# Patient Record
Sex: MALE | Race: WHITE | NOT HISPANIC OR LATINO | Employment: OTHER | URBAN - METROPOLITAN AREA
[De-identification: names, ages, dates, MRNs, and addresses within clinical notes are randomized per-mention and may not be internally consistent; named-entity substitution may affect disease eponyms.]

---

## 2017-05-16 ENCOUNTER — RECORDS - HEALTHEAST (OUTPATIENT)
Dept: LAB | Facility: CLINIC | Age: 72
End: 2017-05-16

## 2017-05-16 LAB
CHOLEST SERPL-MCNC: 176 MG/DL
FASTING STATUS PATIENT QL REPORTED: YES
HDLC SERPL-MCNC: 65 MG/DL
LDLC SERPL CALC-MCNC: 91 MG/DL
TRIGL SERPL-MCNC: 98 MG/DL

## 2017-08-01 ENCOUNTER — RECORDS - HEALTHEAST (OUTPATIENT)
Dept: LAB | Facility: CLINIC | Age: 72
End: 2017-08-01

## 2017-08-01 LAB — PSA SERPL-MCNC: 0.5 NG/ML (ref 0–6.5)

## 2018-01-24 ENCOUNTER — AMBULATORY - HEALTHEAST (OUTPATIENT)
Dept: CARDIOLOGY | Facility: CLINIC | Age: 73
End: 2018-01-24

## 2018-01-24 ENCOUNTER — RECORDS - HEALTHEAST (OUTPATIENT)
Dept: ADMINISTRATIVE | Facility: OTHER | Age: 73
End: 2018-01-24

## 2018-01-30 ENCOUNTER — OFFICE VISIT - HEALTHEAST (OUTPATIENT)
Dept: CARDIOLOGY | Facility: CLINIC | Age: 73
End: 2018-01-30

## 2018-01-30 DIAGNOSIS — E78.5 DYSLIPIDEMIA: ICD-10-CM

## 2018-01-30 DIAGNOSIS — I10 ESSENTIAL HYPERTENSION: ICD-10-CM

## 2018-01-30 DIAGNOSIS — I25.10 CORONARY ARTERY DISEASE INVOLVING NATIVE CORONARY ARTERY OF NATIVE HEART WITHOUT ANGINA PECTORIS: ICD-10-CM

## 2018-01-30 ASSESSMENT — MIFFLIN-ST. JEOR: SCORE: 1978.23

## 2018-02-06 ENCOUNTER — RECORDS - HEALTHEAST (OUTPATIENT)
Dept: LAB | Facility: CLINIC | Age: 73
End: 2018-02-06

## 2018-02-06 LAB
ALBUMIN SERPL-MCNC: 3.9 G/DL (ref 3.5–5)
ALP SERPL-CCNC: 97 U/L (ref 45–120)
ALT SERPL W P-5'-P-CCNC: 34 U/L (ref 0–45)
ANION GAP SERPL CALCULATED.3IONS-SCNC: 10 MMOL/L (ref 5–18)
AST SERPL W P-5'-P-CCNC: 24 U/L (ref 0–40)
BILIRUB SERPL-MCNC: 0.9 MG/DL (ref 0–1)
BUN SERPL-MCNC: 21 MG/DL (ref 8–28)
CALCIUM SERPL-MCNC: 9.4 MG/DL (ref 8.5–10.5)
CHLORIDE BLD-SCNC: 106 MMOL/L (ref 98–107)
CHOLEST SERPL-MCNC: 205 MG/DL
CO2 SERPL-SCNC: 25 MMOL/L (ref 22–31)
CREAT SERPL-MCNC: 0.99 MG/DL (ref 0.7–1.3)
FASTING STATUS PATIENT QL REPORTED: YES
GFR SERPL CREATININE-BSD FRML MDRD: >60 ML/MIN/1.73M2
GLUCOSE BLD-MCNC: 100 MG/DL (ref 70–125)
HDLC SERPL-MCNC: 69 MG/DL
LDLC SERPL CALC-MCNC: 113 MG/DL
POTASSIUM BLD-SCNC: 4.3 MMOL/L (ref 3.5–5)
PROT SERPL-MCNC: 6.5 G/DL (ref 6–8)
PSA SERPL-MCNC: 0.6 NG/ML (ref 0–6.5)
SODIUM SERPL-SCNC: 141 MMOL/L (ref 136–145)
TRIGL SERPL-MCNC: 113 MG/DL

## 2018-07-12 ENCOUNTER — RECORDS - HEALTHEAST (OUTPATIENT)
Dept: LAB | Facility: CLINIC | Age: 73
End: 2018-07-12

## 2018-07-12 LAB — PSA SERPL-MCNC: 0.5 NG/ML (ref 0–6.5)

## 2018-12-27 ENCOUNTER — COMMUNICATION - HEALTHEAST (OUTPATIENT)
Dept: ADMINISTRATIVE | Facility: CLINIC | Age: 73
End: 2018-12-27

## 2019-01-23 ENCOUNTER — RECORDS - HEALTHEAST (OUTPATIENT)
Dept: LAB | Facility: CLINIC | Age: 74
End: 2019-01-23

## 2019-01-23 LAB
ALBUMIN SERPL-MCNC: 4 G/DL (ref 3.5–5)
ALP SERPL-CCNC: 94 U/L (ref 45–120)
ALT SERPL W P-5'-P-CCNC: 44 U/L (ref 0–45)
ANION GAP SERPL CALCULATED.3IONS-SCNC: 13 MMOL/L (ref 5–18)
AST SERPL W P-5'-P-CCNC: 29 U/L (ref 0–40)
BILIRUB SERPL-MCNC: 0.8 MG/DL (ref 0–1)
BUN SERPL-MCNC: 23 MG/DL (ref 8–28)
CALCIUM SERPL-MCNC: 9.7 MG/DL (ref 8.5–10.5)
CHLORIDE BLD-SCNC: 107 MMOL/L (ref 98–107)
CHOLEST SERPL-MCNC: 191 MG/DL
CO2 SERPL-SCNC: 21 MMOL/L (ref 22–31)
CREAT SERPL-MCNC: 1 MG/DL (ref 0.7–1.3)
FASTING STATUS PATIENT QL REPORTED: NO
GFR SERPL CREATININE-BSD FRML MDRD: >60 ML/MIN/1.73M2
GLUCOSE BLD-MCNC: 99 MG/DL (ref 70–125)
HDLC SERPL-MCNC: 80 MG/DL
LDLC SERPL CALC-MCNC: 94 MG/DL
POTASSIUM BLD-SCNC: 4.2 MMOL/L (ref 3.5–5)
PROT SERPL-MCNC: 6.5 G/DL (ref 6–8)
PSA SERPL-MCNC: 0.8 NG/ML (ref 0–6.5)
SODIUM SERPL-SCNC: 141 MMOL/L (ref 136–145)
TRIGL SERPL-MCNC: 84 MG/DL

## 2019-01-24 ENCOUNTER — RECORDS - HEALTHEAST (OUTPATIENT)
Dept: ADMINISTRATIVE | Facility: OTHER | Age: 74
End: 2019-01-24

## 2019-01-24 ENCOUNTER — AMBULATORY - HEALTHEAST (OUTPATIENT)
Dept: CARDIOLOGY | Facility: CLINIC | Age: 74
End: 2019-01-24

## 2019-01-31 ENCOUNTER — OFFICE VISIT - HEALTHEAST (OUTPATIENT)
Dept: CARDIOLOGY | Facility: CLINIC | Age: 74
End: 2019-01-31

## 2019-01-31 DIAGNOSIS — I25.10 CORONARY ARTERY DISEASE INVOLVING NATIVE CORONARY ARTERY OF NATIVE HEART WITHOUT ANGINA PECTORIS: ICD-10-CM

## 2019-01-31 DIAGNOSIS — I10 ESSENTIAL HYPERTENSION: ICD-10-CM

## 2019-01-31 DIAGNOSIS — E78.5 DYSLIPIDEMIA: ICD-10-CM

## 2019-01-31 ASSESSMENT — MIFFLIN-ST. JEOR: SCORE: 1878.44

## 2019-07-18 ENCOUNTER — RECORDS - HEALTHEAST (OUTPATIENT)
Dept: LAB | Facility: CLINIC | Age: 74
End: 2019-07-18

## 2019-07-18 LAB — PSA SERPL-MCNC: 0.9 NG/ML (ref 0–6.5)

## 2020-01-09 ENCOUNTER — RECORDS - HEALTHEAST (OUTPATIENT)
Dept: LAB | Facility: CLINIC | Age: 75
End: 2020-01-09

## 2020-01-09 LAB
ALBUMIN SERPL-MCNC: 4.1 G/DL (ref 3.5–5)
ALP SERPL-CCNC: 97 U/L (ref 45–120)
ALT SERPL W P-5'-P-CCNC: 32 U/L (ref 0–45)
ANION GAP SERPL CALCULATED.3IONS-SCNC: 11 MMOL/L (ref 5–18)
AST SERPL W P-5'-P-CCNC: 25 U/L (ref 0–40)
BILIRUB SERPL-MCNC: 0.9 MG/DL (ref 0–1)
BUN SERPL-MCNC: 16 MG/DL (ref 8–28)
CALCIUM SERPL-MCNC: 9.4 MG/DL (ref 8.5–10.5)
CHLORIDE BLD-SCNC: 106 MMOL/L (ref 98–107)
CHOLEST SERPL-MCNC: 189 MG/DL
CO2 SERPL-SCNC: 24 MMOL/L (ref 22–31)
CREAT SERPL-MCNC: 0.97 MG/DL (ref 0.7–1.3)
FASTING STATUS PATIENT QL REPORTED: YES
GFR SERPL CREATININE-BSD FRML MDRD: >60 ML/MIN/1.73M2
GLUCOSE BLD-MCNC: 94 MG/DL (ref 70–125)
HDLC SERPL-MCNC: 74 MG/DL
LDLC SERPL CALC-MCNC: 98 MG/DL
POTASSIUM BLD-SCNC: 4.1 MMOL/L (ref 3.5–5)
PROT SERPL-MCNC: 6.6 G/DL (ref 6–8)
PSA SERPL-MCNC: 1 NG/ML (ref 0–6.5)
SODIUM SERPL-SCNC: 141 MMOL/L (ref 136–145)
TRIGL SERPL-MCNC: 83 MG/DL

## 2020-01-13 ENCOUNTER — AMBULATORY - HEALTHEAST (OUTPATIENT)
Dept: CARDIOLOGY | Facility: CLINIC | Age: 75
End: 2020-01-13

## 2020-02-27 ENCOUNTER — AMBULATORY - HEALTHEAST (OUTPATIENT)
Dept: CARDIOLOGY | Facility: CLINIC | Age: 75
End: 2020-02-27

## 2020-02-27 ENCOUNTER — RECORDS - HEALTHEAST (OUTPATIENT)
Dept: ADMINISTRATIVE | Facility: OTHER | Age: 75
End: 2020-02-27

## 2020-03-04 ENCOUNTER — OFFICE VISIT - HEALTHEAST (OUTPATIENT)
Dept: CARDIOLOGY | Facility: CLINIC | Age: 75
End: 2020-03-04

## 2020-03-04 DIAGNOSIS — I25.10 CORONARY ARTERY DISEASE INVOLVING NATIVE CORONARY ARTERY OF NATIVE HEART WITHOUT ANGINA PECTORIS: ICD-10-CM

## 2020-03-04 DIAGNOSIS — I10 ESSENTIAL HYPERTENSION: ICD-10-CM

## 2020-03-04 DIAGNOSIS — R07.2 PRECORDIAL PAIN: ICD-10-CM

## 2020-03-04 DIAGNOSIS — E78.5 DYSLIPIDEMIA: ICD-10-CM

## 2020-03-04 ASSESSMENT — MIFFLIN-ST. JEOR: SCORE: 1923.8

## 2020-03-19 ENCOUNTER — HOSPITAL ENCOUNTER (OUTPATIENT)
Dept: CARDIOLOGY | Facility: CLINIC | Age: 75
Discharge: HOME | End: 2020-03-19
Attending: INTERNAL MEDICINE

## 2020-03-19 DIAGNOSIS — R07.2 PRECORDIAL PAIN: ICD-10-CM

## 2020-03-19 LAB
CV STRESS CURRENT BP HE: NORMAL
CV STRESS CURRENT HR HE: 106
CV STRESS CURRENT HR HE: 108
CV STRESS CURRENT HR HE: 108
CV STRESS CURRENT HR HE: 111
CV STRESS CURRENT HR HE: 120
CV STRESS CURRENT HR HE: 122
CV STRESS CURRENT HR HE: 124
CV STRESS CURRENT HR HE: 128
CV STRESS CURRENT HR HE: 128
CV STRESS CURRENT HR HE: 129
CV STRESS CURRENT HR HE: 69
CV STRESS CURRENT HR HE: 74
CV STRESS CURRENT HR HE: 75
CV STRESS CURRENT HR HE: 76
CV STRESS CURRENT HR HE: 77
CV STRESS CURRENT HR HE: 80
CV STRESS CURRENT HR HE: 80
CV STRESS CURRENT HR HE: 82
CV STRESS CURRENT HR HE: 83
CV STRESS CURRENT HR HE: 86
CV STRESS CURRENT HR HE: 89
CV STRESS CURRENT HR HE: 92
CV STRESS CURRENT HR HE: 95
CV STRESS CURRENT HR HE: 96
CV STRESS CURRENT HR HE: 96
CV STRESS CURRENT HR HE: 98
CV STRESS DEVIATION TIME HE: NORMAL
CV STRESS ECHO PERCENT HR HE: NORMAL
CV STRESS EXERCISE STAGE HE: NORMAL
CV STRESS FINAL RESTING BP HE: NORMAL
CV STRESS FINAL RESTING HR HE: 77
CV STRESS MAX HR HE: 129
CV STRESS MAX TREADMILL GRADE HE: 14
CV STRESS MAX TREADMILL SPEED HE: 3.4
CV STRESS PEAK DIA BP HE: NORMAL
CV STRESS PEAK SYS BP HE: NORMAL
CV STRESS PHASE HE: NORMAL
CV STRESS PROTOCOL HE: NORMAL
CV STRESS RESTING PT POSITION HE: NORMAL
CV STRESS RESTING PT POSITION HE: NORMAL
CV STRESS ST DEVIATION AMOUNT HE: NORMAL
CV STRESS ST DEVIATION ELEVATION HE: NORMAL
CV STRESS ST EVELATION AMOUNT HE: NORMAL
CV STRESS TEST TYPE HE: NORMAL
CV STRESS TOTAL STAGE TIME MIN 1 HE: NORMAL
ECHO EJECTION FRACTION ESTIMATED: 60 %
RATE PRESSURE PRODUCT: NORMAL
STRESS ECHO BASELINE DIASTOLIC HE: 95
STRESS ECHO BASELINE HR: 75
STRESS ECHO BASELINE SYSTOLIC BP: 141
STRESS ECHO CALCULATED PERCENT HR: 87 %
STRESS ECHO LAST STRESS DIASTOLIC BP: 92
STRESS ECHO LAST STRESS HR: 128
STRESS ECHO LAST STRESS SYSTOLIC BP: 240
STRESS ECHO POST ESTIMATED WORKLOAD: 10.3
STRESS ECHO POST EXERCISE DUR MIN: 8
STRESS ECHO POST EXERCISE DUR SEC: 45
STRESS ECHO TARGET HR: 148

## 2020-08-04 ENCOUNTER — RECORDS - HEALTHEAST (OUTPATIENT)
Dept: LAB | Facility: CLINIC | Age: 75
End: 2020-08-04

## 2020-08-04 LAB — PSA SERPL-MCNC: 1.1 NG/ML (ref 0–6.5)

## 2021-01-14 ENCOUNTER — RECORDS - HEALTHEAST (OUTPATIENT)
Dept: LAB | Facility: CLINIC | Age: 76
End: 2021-01-14

## 2021-01-14 LAB
ALBUMIN SERPL-MCNC: 4 G/DL (ref 3.5–5)
ALP SERPL-CCNC: 100 U/L (ref 45–120)
ALT SERPL W P-5'-P-CCNC: 33 U/L (ref 0–45)
ANION GAP SERPL CALCULATED.3IONS-SCNC: 12 MMOL/L (ref 5–18)
AST SERPL W P-5'-P-CCNC: 25 U/L (ref 0–40)
BILIRUB SERPL-MCNC: 0.9 MG/DL (ref 0–1)
BUN SERPL-MCNC: 23 MG/DL (ref 8–28)
CALCIUM SERPL-MCNC: 9.6 MG/DL (ref 8.5–10.5)
CHLORIDE BLD-SCNC: 108 MMOL/L (ref 98–107)
CHOLEST SERPL-MCNC: 149 MG/DL
CO2 SERPL-SCNC: 21 MMOL/L (ref 22–31)
CREAT SERPL-MCNC: 1.08 MG/DL (ref 0.7–1.3)
FASTING STATUS PATIENT QL REPORTED: NO
GFR SERPL CREATININE-BSD FRML MDRD: >60 ML/MIN/1.73M2
GLUCOSE BLD-MCNC: 91 MG/DL (ref 70–125)
HDLC SERPL-MCNC: 61 MG/DL
LDLC SERPL CALC-MCNC: 71 MG/DL
POTASSIUM BLD-SCNC: 4.7 MMOL/L (ref 3.5–5)
PROT SERPL-MCNC: 6.4 G/DL (ref 6–8)
PSA SERPL-MCNC: 1.6 NG/ML (ref 0–6.5)
SODIUM SERPL-SCNC: 141 MMOL/L (ref 136–145)
TRIGL SERPL-MCNC: 84 MG/DL

## 2021-02-23 ENCOUNTER — COMMUNICATION - HEALTHEAST (OUTPATIENT)
Dept: CARDIOLOGY | Facility: CLINIC | Age: 76
End: 2021-02-23

## 2021-02-23 DIAGNOSIS — E78.5 DYSLIPIDEMIA: ICD-10-CM

## 2021-03-18 ENCOUNTER — AMBULATORY - HEALTHEAST (OUTPATIENT)
Dept: CARDIOLOGY | Facility: CLINIC | Age: 76
End: 2021-03-18

## 2021-03-18 ENCOUNTER — RECORDS - HEALTHEAST (OUTPATIENT)
Dept: ADMINISTRATIVE | Facility: OTHER | Age: 76
End: 2021-03-18

## 2021-03-19 ENCOUNTER — RECORDS - HEALTHEAST (OUTPATIENT)
Dept: ADMINISTRATIVE | Facility: OTHER | Age: 76
End: 2021-03-19

## 2021-03-23 ENCOUNTER — OFFICE VISIT - HEALTHEAST (OUTPATIENT)
Dept: CARDIOLOGY | Facility: CLINIC | Age: 76
End: 2021-03-23

## 2021-03-23 DIAGNOSIS — I10 ESSENTIAL HYPERTENSION: ICD-10-CM

## 2021-03-23 DIAGNOSIS — E78.5 DYSLIPIDEMIA: ICD-10-CM

## 2021-03-23 DIAGNOSIS — I25.10 CORONARY ARTERY DISEASE INVOLVING NATIVE CORONARY ARTERY OF NATIVE HEART WITHOUT ANGINA PECTORIS: ICD-10-CM

## 2021-03-23 ASSESSMENT — MIFFLIN-ST. JEOR: SCORE: 1901.12

## 2021-03-31 ENCOUNTER — AMBULATORY - HEALTHEAST (OUTPATIENT)
Dept: SURGERY | Facility: HOSPITAL | Age: 76
End: 2021-03-31

## 2021-03-31 DIAGNOSIS — Z11.59 ENCOUNTER FOR SCREENING FOR OTHER VIRAL DISEASES: ICD-10-CM

## 2021-04-13 ENCOUNTER — COMMUNICATION - HEALTHEAST (OUTPATIENT)
Dept: SCHEDULING | Facility: CLINIC | Age: 76
End: 2021-04-13

## 2021-04-15 ENCOUNTER — RECORDS - HEALTHEAST (OUTPATIENT)
Dept: LAB | Facility: CLINIC | Age: 76
End: 2021-04-15

## 2021-04-15 LAB
ANION GAP SERPL CALCULATED.3IONS-SCNC: 12 MMOL/L (ref 5–18)
BUN SERPL-MCNC: 24 MG/DL (ref 8–28)
CALCIUM SERPL-MCNC: 8.8 MG/DL (ref 8.5–10.5)
CHLORIDE BLD-SCNC: 108 MMOL/L (ref 98–107)
CO2 SERPL-SCNC: 21 MMOL/L (ref 22–31)
CREAT SERPL-MCNC: 0.99 MG/DL (ref 0.7–1.3)
GFR SERPL CREATININE-BSD FRML MDRD: >60 ML/MIN/1.73M2
GLUCOSE BLD-MCNC: 129 MG/DL (ref 70–125)
POTASSIUM BLD-SCNC: 4 MMOL/L (ref 3.5–5)
SODIUM SERPL-SCNC: 141 MMOL/L (ref 136–145)

## 2021-04-16 ENCOUNTER — AMBULATORY - HEALTHEAST (OUTPATIENT)
Dept: FAMILY MEDICINE | Facility: CLINIC | Age: 76
End: 2021-04-16

## 2021-04-16 DIAGNOSIS — Z11.59 ENCOUNTER FOR SCREENING FOR OTHER VIRAL DISEASES: ICD-10-CM

## 2021-04-18 ENCOUNTER — COMMUNICATION - HEALTHEAST (OUTPATIENT)
Dept: SCHEDULING | Facility: CLINIC | Age: 76
End: 2021-04-18

## 2021-04-19 ENCOUNTER — ANESTHESIA - HEALTHEAST (OUTPATIENT)
Dept: SURGERY | Facility: HOSPITAL | Age: 76
End: 2021-04-19

## 2021-04-20 ENCOUNTER — SURGERY - HEALTHEAST (OUTPATIENT)
Dept: SURGERY | Facility: HOSPITAL | Age: 76
End: 2021-04-20

## 2021-05-20 ENCOUNTER — COMMUNICATION - HEALTHEAST (OUTPATIENT)
Dept: CARDIOLOGY | Facility: CLINIC | Age: 76
End: 2021-05-20

## 2021-05-20 DIAGNOSIS — E78.5 DYSLIPIDEMIA: ICD-10-CM

## 2021-05-20 RX ORDER — ATORVASTATIN CALCIUM 80 MG/1
80 TABLET, FILM COATED ORAL AT BEDTIME
Qty: 90 TABLET | Refills: 1 | Status: SHIPPED | OUTPATIENT
Start: 2021-05-20 | End: 2021-11-12

## 2021-05-28 ENCOUNTER — RECORDS - HEALTHEAST (OUTPATIENT)
Dept: ADMINISTRATIVE | Facility: CLINIC | Age: 76
End: 2021-05-28

## 2021-05-29 ENCOUNTER — RECORDS - HEALTHEAST (OUTPATIENT)
Dept: ADMINISTRATIVE | Facility: CLINIC | Age: 76
End: 2021-05-29

## 2021-05-30 ENCOUNTER — RECORDS - HEALTHEAST (OUTPATIENT)
Dept: ADMINISTRATIVE | Facility: CLINIC | Age: 76
End: 2021-05-30

## 2021-06-01 VITALS — BODY MASS INDEX: 34.46 KG/M2 | HEIGHT: 73 IN | WEIGHT: 260 LBS

## 2021-06-02 VITALS — HEIGHT: 73 IN | WEIGHT: 238 LBS | BODY MASS INDEX: 31.54 KG/M2

## 2021-06-04 VITALS
SYSTOLIC BLOOD PRESSURE: 144 MMHG | DIASTOLIC BLOOD PRESSURE: 86 MMHG | HEART RATE: 68 BPM | WEIGHT: 248 LBS | BODY MASS INDEX: 32.87 KG/M2 | HEIGHT: 73 IN | RESPIRATION RATE: 16 BRPM

## 2021-06-05 VITALS
BODY MASS INDEX: 32.2 KG/M2 | DIASTOLIC BLOOD PRESSURE: 64 MMHG | RESPIRATION RATE: 16 BRPM | HEART RATE: 68 BPM | HEIGHT: 73 IN | WEIGHT: 243 LBS | SYSTOLIC BLOOD PRESSURE: 130 MMHG

## 2021-06-05 VITALS — WEIGHT: 238 LBS | BODY MASS INDEX: 31.4 KG/M2
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
## 2021-06-16 PROBLEM — S42.002A: Status: ACTIVE | Noted: 2020-05-22

## 2021-06-16 PROBLEM — N20.0 CALCULUS OF KIDNEY: Status: ACTIVE | Noted: 2021-04-20

## 2021-06-16 PROBLEM — N20.1 URETERAL STONE: Status: ACTIVE | Noted: 2021-04-20

## 2021-06-16 NOTE — ANESTHESIA CARE TRANSFER NOTE
Last vitals:   Vitals:    04/20/21 0830   BP: 154/75   Pulse: 80   Resp: 21   Temp: 36.9  C (98.5  F)   SpO2: 98%     Patient's level of consciousness is awake and drowsy  Spontaneous respirations: yes  Maintains airway independently: yes  Dentition unchanged: yes  Oropharynx: oropharynx clear of all foreign objects    QCDR Measures:  ASA# 20 - Surgical Safety Checklist: WHO surgical safety checklist completed prior to induction    PQRS# 430 - Adult PONV Prevention: 4558F - Pt received => 2 anti-emetic agents (different classes) preop & intraop  ASA# 8 - Peds PONV Prevention: NA - Not pediatric patient, not GA or 2 or more risk factors NOT present  PQRS# 424 - Cadence-op Temp Management: 4559F - At least one body temp DOCUMENTED => 35.5C or 95.9F within required timeframe  PQRS# 426 - PACU Transfer Protocol: - Transfer of care checklist used  ASA# 14 - Acute Post-op Pain: ASA14B - Patient did NOT experience pain >= 7 out of 10     Volatile agents turned off, muscle relaxant reversed, 4/4 twitches with sustained tetany. Pt breathing spontaneously with adequate tidal volumes, following commands, gently suctioned oropharynx, extubated without issue. 10LPM O2 applied via face mask.Transported by CRNA and RN to recovery.

## 2021-06-16 NOTE — ANESTHESIA POSTPROCEDURE EVALUATION
Patient: Jc Balderrama .  Procedure(s):  CYSTOSCOPY WITH RIGHT URETEROSCOPY, LASER LITHOTRIPSY,  INSERTION OF RIGHT URETERAL STENT, URETERAL AND RENAL STONE ON RIGHT (Right)  Anesthesia type: general    Patient location: PACU  Last vitals:   Vitals Value Taken Time   /81 04/20/21 0900   Temp 36.9  C (98.5  F) 04/20/21 0830   Pulse 67 04/20/21 0901   Resp 16 04/20/21 0901   SpO2 98 % 04/20/21 0901   Vitals shown include unvalidated device data.  Post vital signs: stable  Level of consciousness: alert, drinking water  Post-anesthesia pain: pain controlled  Post-anesthesia nausea and vomiting: no  Pulmonary: unassisted, return to baseline  Cardiovascular: stable and blood pressure at baseline  Hydration: adequate  Anesthetic events: no    QCDR Measures:  ASA# 11 - Cadnece-op Cardiac Arrest: ASA11B - Patient did NOT experience unanticipated cardiac arrest  ASA# 12 - Cadence-op Mortality Rate: ASA12B - Patient did NOT die  ASA# 13 - PACU Re-Intubation Rate: ASA13B - Patient did NOT require a new airway mgmt  ASA# 10 - Composite Anes Safety: ASA10A - No serious adverse event    Additional Notes:

## 2021-06-16 NOTE — ANESTHESIA PREPROCEDURE EVALUATION
Anesthesia Evaluation      Patient summary reviewed   No history of anesthetic complications     Airway   Mallampati: I  Neck ROM: full   Pulmonary - negative ROS and normal exam                          Cardiovascular - normal exam  Exercise tolerance: > or = 4 METS  (+) hypertension, CAD, CABG/stent, , hypercholesterolemia,      Neuro/Psych - negative ROS     Endo/Other    (+) obesity,      GI/Hepatic/Renal    (+)   chronic renal disease,      Other findings: Nephrolithiasis, BMI 32. NMST 3/2020 neg.  H/o prostate Ca.  Hg 13.9, K 4.0, GFR>60.        Dental - normal exam                        Anesthesia Plan  Planned anesthetic: general endotracheal    ASA 3   Induction: intravenous   Anesthetic plan and risks discussed with: patient  Anesthesia plan special considerations: antiemetics,   Post-op plan: routine recovery

## 2021-06-26 ENCOUNTER — HEALTH MAINTENANCE LETTER (OUTPATIENT)
Age: 76
End: 2021-06-26

## 2021-06-26 NOTE — PROGRESS NOTES
Progress Notes by Joann Stevens MD at 1/30/2018  7:50 AM     Author: Joann Stevens MD Service: -- Author Type: Physician    Filed: 1/30/2018  8:22 AM Encounter Date: 1/30/2018 Status: Signed    : Joann Stevens MD (Physician)                  [email or web address]  [phone]         Thank you Dr. Yeh for asking the [facility] Care team to participate in the care of your patient, Jc Balderrama Sr..     Impression and Plan     1. Coronary artery disease.  Jc has known coronary artery disease.  Specifically, Jc underwent coronary artery bypass graft surgery 8 January 2013, at which time, he had successful bypass with YESSENIA graft to the LAD, saphenous vein graft to the PDA-PL sequentially, and saphenous vein graft to the ramus to obtuse marginal No. 1. & sequentially to obtuse marginal No. 2.    Exercise nuclear perfusion study earlier this year 25 March 2016 was favorable and revealed no evidence of ischemia.    This is stable. Patient reports no chest pain or other concerning symptoms.    2. Hypertension.  Blood pressure recently has been trending upward.  Blood pressure is modestly elevated in the office today as well.  Plan:    Add Lisinopril 10 mg daily to medical regimen.    Patient has a home blood pressure monitoring device and I asked that he continue to monitor his blood pressure and contact me should he have a tendency toward continued higher readings after initiation of Lisinopril.    3. Dyslipidemia.  Lipid profile 16 May 2017 revealed LDL 61 mg/dL and HDL 65 mg/dL.    Patient is scheduled for repeat lipid profile at primary providers in approximately one week.    Will plan to follow-up in one year.           History of Present Illness    Once again I would like to thank you again for asking me to participate in the care of your patient, Jc Gradyroxann Hale.  As you know, but to reiterate for my own records, Jc BANEGAS Tacos Hale is a 72 y.o.  "male with known coronary disease. He underwent coronary artery bypass graft surgery 8 January 2013, at which time, he had successful bypass with YESSENIA graft to the LAD, saphenous vein graft to the PDA-PL sequentially, and saphenous vein graft to the ramus to obtuse marginal No. 1. & sequentially to obtuse marginal No. 2.    In follow-up today, patient states that he has been doing well. He reports no anginal type chest pain, shortness of breath, or diminished exercise tolerance. He denies any palpitations or lightheadedness.    Further review of systems is otherwise negative/noncontributory (medical record and 13 point review of systems reviewed as well and pertinent positives noted).         Cardiac Diagnostics      Exercise nuclear perfusion study 25 March 2016:  1. No evidence of infarct or ischemia.  2. Normal left ventricular systolic performance with ejection fraction of 63%.    Echocardiogram 8 January 2013 (intraoperative RALF):  1. Normal left ventricular size and systolic performance.  2. Moderate concentric increase in thickness.  3. No significant valvular heart disease.         Physical Examination       /74 (Patient Site: Right Arm, Patient Position: Sitting, Cuff Size: Adult Large)  Pulse 76  Resp 18  Ht 6' 1\" (1.854 m)  Wt (!) 260 lb (117.9 kg) Comment: with shoes  BMI 34.3 kg/m2        Wt Readings from Last 3 Encounters:   01/30/18 (!) 260 lb (117.9 kg)   09/29/16 (!) 259 lb (117.5 kg)   03/25/16 (!) 250 lb (113.4 kg)     The patient is alert and oriented times three. Sclerae are anicteric. Mucosal membranes are moist. Jugular venous pressure is normal. No significant adenopathy/thyromegally appreciated. Lungs are clear with good expansion. On cardiovascular exam, the patient has a regular S1 and S2. Abdomen is soft and non-tender. Extremities reveal no clubbing, cyanosis, or edema.       Family History/Social History/Risk Factors   Patient does not smoke.  Family history of hypertension.   "       Medications  Allergies   Current Outpatient Prescriptions   Medication Sig Dispense Refill   ? amLODIPine (NORVASC) 10 MG tablet Take 10 mg by mouth daily.     ? aspirin 325 MG tablet Take 325 mg by mouth daily.     ? atorvastatin (LIPITOR) 40 MG tablet TAKE 1 TABLET BY MOUTH EVERY DAY 90 tablet 0   ? cholecalciferol, vitamin D3, 5,000 unit Tab Take by mouth daily.     ? coenzyme Q10 75 mg cap Take 1 capsule by mouth daily.     ? DOCOSAHEXANOIC ACID/EPA (FISH OIL ORAL) Take by mouth. 1360 mg  Take 1 Capsule Daily     ? flaxseed oil 1,000 mg cap Take 1 capsule by mouth daily.     ? ginkgo biloba 40 mg Tab Use As Directed.     ? multivitamin therapeutic (THERAGRAN) tablet Take 1 tablet by mouth daily.     ? potassium chloride SA (K-DUR,KLOR-CON) 10 MEQ tablet Take 10 mEq by mouth daily.     ? zinc gluconate 30 mg Tab Take by mouth daily.     ? hydrochlorothiazide (HYDRODIURIL) 25 MG tablet Take 25 mg by mouth daily.     ? lisinopril (PRINIVIL,ZESTRIL) 10 MG tablet Take 1 tablet (10 mg total) by mouth daily. 90 tablet 3   ? ondansetron (ZOFRAN-ODT) 4 MG disintegrating tablet Take 4 mg by mouth every 8 (eight) hours as needed for nausea.     ? tamsulosin (FLOMAX) 0.4 mg Cp24 Take 0.4 mg by mouth.       No current facility-administered medications for this visit.       No Known Allergies       Lab Results   Lab Results   Component Value Date     05/16/2017    K 4.3 05/16/2017     (H) 05/16/2017    CO2 28 05/16/2017    BUN 13 05/16/2017    CREATININE 0.96 05/16/2017    CALCIUM 9.4 05/16/2017     Lab Results   Component Value Date    WBC 6.2 01/05/2016    HGB 14.8 01/05/2016    HCT 43.6 01/05/2016    MCV 89 01/05/2016     01/05/2016     Lab Results   Component Value Date    CHOL 176 05/16/2017    TRIG 98 05/16/2017    HDL 65 05/16/2017    LDLCALC 91 05/16/2017     Lab Results   Component Value Date    INR 1.52 (H) 01/08/2013

## 2021-06-27 NOTE — PROGRESS NOTES
Progress Notes by Joann Stevens MD at 1/31/2019  1:10 PM     Author: Joann Stevens MD Service: -- Author Type: Physician    Filed: 1/31/2019  1:43 PM Encounter Date: 1/31/2019 Status: Signed    : Joann Stevens MD (Physician)                  [email or web address]  [phone]         Thank you Dr. Yeh for asking the [facility] Care team to participate in the care of your patient, Jc Balderrama Sr..     Impression and Plan     1. Coronary artery disease.  Jc has known coronary artery disease.  Specifically, Jc underwent coronary artery bypass graft surgery 8 January 2013, at which time, he had successful bypass with YESSENIA graft to the LAD, saphenous vein graft to the PDA-PL sequentially, and saphenous vein graft to the ramus to obtuse marginal No. 1. & sequentially to obtuse marginal No. 2.     Exercise nuclear perfusion study 25 March 2016 was favorable and revealed no evidence of ischemia.     This is stable. Patient reports no chest pain or other concerning symptoms.     2.  Hypertension.  Blood pressure is reasonable in the office today.    3. Dyslipidemia.    Lipid profile 23 January 2019 revealed LDL 94 mg/dL and HDL 80 mg/dL.    Plan on follow-up in 1 year.         History of Present Illness    Once again I would like to thank you again for asking me to participate in the care of your patient, Jc Balderrama Sr..  As you know, but to reiterate for my own records, Jc Balderrama Sr. is a 73 y.o. male with known coronary disease. He underwent coronary artery bypass graft surgery 8 January 2013, at which time, he had successful bypass with YESSENIA graft to the LAD, saphenous vein graft to the PDA-PL sequentially, and saphenous vein graft to the ramus to obtuse marginal No. 1. & sequentially to obtuse marginal No. 2.     In follow-up today, patient states that he has been doing well. He reports no anginal type chest pain, shortness of breath, or  "diminished exercise tolerance. He denies any palpitations or lightheadedness.    Further review of systems is otherwise negative/noncontributory (medical record and 13 point review of systems reviewed as well and pertinent positives noted).         Cardiac Diagnostics      Exercise nuclear perfusion study 25 March 2016:  1. No evidence of infarct or ischemia.  2. Normal left ventricular systolic performance with ejection fraction of 63%.    Echocardiogram 8 January 2013 (intraoperative RALF):  1. Normal left ventricular size and systolic performance.  2. Moderate concentric increase in thickness.  3. No significant valvular heart disease.           Physical Examination       BP 98/66 (Patient Site: Right Arm, Patient Position: Sitting, Cuff Size: Adult Large)   Pulse 76   Resp 16   Ht 6' 1\" (1.854 m)   Wt (!) 238 lb (108 kg)   BMI 31.40 kg/m          Wt Readings from Last 3 Encounters:   01/31/19 (!) 238 lb (108 kg)   01/30/18 (!) 260 lb (117.9 kg)   09/29/16 (!) 259 lb (117.5 kg)     The patient is alert and oriented times three. Sclerae are anicteric. Mucosal membranes are moist. Jugular venous pressure is normal. No significant adenopathy/thyromegally appreciated. Lungs are clear with good expansion. On cardiovascular exam, the patient has a regular S1 and S2. Abdomen is soft and non-tender. Extremities reveal no clubbing, cyanosis, or edema.       Family History/Social History/Risk Factors   Patient does not smoke.  Family history of hypertension.         Medications  Allergies   Current Outpatient Medications   Medication Sig Dispense Refill   ? amLODIPine (NORVASC) 10 MG tablet Take 10 mg by mouth daily.     ? aspirin 325 MG tablet Take 325 mg by mouth daily.     ? atorvastatin (LIPITOR) 40 MG tablet TAKE 1 TABLET BY MOUTH EVERY DAY 90 tablet 0   ? coenzyme Q10 75 mg cap Take 1 capsule by mouth daily.     ? DOCOSAHEXANOIC ACID/EPA (FISH OIL ORAL) Take by mouth. 1360 mg  Take 1 Capsule Daily     ? ginkgo " biloba 40 mg Tab Use As Directed.     ? multivitamin therapeutic (THERAGRAN) tablet Take 1 tablet by mouth daily.     ? potassium chloride SA (K-DUR,KLOR-CON) 10 MEQ tablet Take 10 mEq by mouth daily.     ? zinc gluconate 30 mg Tab Take by mouth daily.     ? cholecalciferol, vitamin D3, 5,000 unit Tab Take by mouth daily.     ? flaxseed oil 1,000 mg cap Take 1 capsule by mouth daily.     ? hydrochlorothiazide (HYDRODIURIL) 25 MG tablet Take 25 mg by mouth daily.     ? lisinopril (PRINIVIL,ZESTRIL) 10 MG tablet Take 1 tablet (10 mg total) by mouth daily. 90 tablet 3   ? ondansetron (ZOFRAN-ODT) 4 MG disintegrating tablet Take 4 mg by mouth every 8 (eight) hours as needed for nausea.     ? tamsulosin (FLOMAX) 0.4 mg Cp24 Take 0.4 mg by mouth.       No current facility-administered medications for this visit.       No Known Allergies       Lab Results   Lab Results   Component Value Date     01/23/2019    K 4.2 01/23/2019     01/23/2019    CO2 21 (L) 01/23/2019    BUN 23 01/23/2019    CREATININE 1.00 01/23/2019    CALCIUM 9.7 01/23/2019     Lab Results   Component Value Date    WBC 6.2 01/05/2016    HGB 14.8 01/05/2016    HCT 43.6 01/05/2016    MCV 89 01/05/2016     01/05/2016     Lab Results   Component Value Date    CHOL 191 01/23/2019    TRIG 84 01/23/2019    HDL 80 01/23/2019    LDLCALC 94 01/23/2019     Lab Results   Component Value Date    INR 1.52 (H) 01/08/2013

## 2021-06-30 NOTE — PROGRESS NOTES
Progress Notes by Joann Stevens MD at 3/23/2021  8:10 AM     Author: Joann Stevens MD Service: -- Author Type: Physician    Filed: 3/23/2021  8:39 AM Encounter Date: 3/23/2021 Status: Signed    : Joann Stevens MD (Physician)                                       Thank you Dr. Yeh for asking the [facility] Care team to participate in the care of your patient, Jc Balderrama Sr..     Impression and Plan     1. Coronary artery disease.  Jc has known coronary artery disease.  Specifically, Jc underwent coronary artery bypass graft surgery 8 January 2013, at which time, he had successful bypass with YESSENIA graft to the LAD, saphenous vein graft to the PDA-PL sequentially, and saphenous vein graft to the ramus to obtuse marginal No. 1. & sequentially to obtuse marginal No. 2.    Stress echocardiogram 19 March 2020 was favorable and revealed no concerning findings.    This is stable.     2.  Hypertension.  Blood pressure is fairly reasonable in the office today at 130/64 mmHg.     3. Dyslipidemia.    Lipid profile 14 January 2021 was at/near target with LDL 71 mg/dL and HDL 61 mg/dL.     Michael on follow-up in 1 year.    20 minutes spent reviewing prior records (including documentation, laboratory studies, cardiac testing/imaging), interview with patient along with physical exam, planning, and subsequent documentation/crafting of note.           History of Present Illness    Once again I would like to thank you again for asking me to participate in the care of your patient, Jc Balderrama Sr..  As you know, but to reiterate for my own records, Jc Balderrama Sr. is a 75 y.o. male with known coronary disease. He underwent coronary artery bypass graft surgery 8 January 2013, at which time, he had successful bypass with YESSENIA graft to the LAD, saphenous vein graft to the PDA-PL sequentially, and saphenous vein graft to the ramus to obtuse marginal No. 1. & sequentially  "to obtuse marginal No. 2.     In follow-up today, Hernandez reports no concerning symptoms of angina.  He denies any significant chest pain or shortness of breath.  No palpitations or lightheadedness.  Denies any fevers, chills, or other constitutional symptoms.    Further review of systems is otherwise negative/noncontributory (medical record and 13 point review of systems reviewed as well and pertinent positives noted).         Cardiac Diagnostics      Stress echocardiogram 19 March 2020:  1. No echocardiographic evidence of ischemia.  2. Normal resting left ventricular systolic performance with ejection fraction of 60%.  3. No ECG evidence of ischemia.  4. Normal functional capacity.    Exercise nuclear perfusion study 25 March 2016:  1. No evidence of infarct or ischemia.  2. Normal left ventricular systolic performance with ejection fraction of 63%.    Echocardiogram 8 January 2013 (intraoperative RALF):  1. Normal left ventricular size and systolic performance.  2. Moderate concentric increase in thickness.  3. No significant valvular heart disease.           Physical Examination       /64 (Patient Site: Left Arm, Patient Position: Sitting, Cuff Size: Adult Large)   Pulse 68   Resp 16   Ht 6' 1\" (1.854 m)   Wt (!) 243 lb (110.2 kg)   BMI 32.06 kg/m          Wt Readings from Last 3 Encounters:   03/23/21 (!) 243 lb (110.2 kg)   05/22/20 (!) 237 lb 3.2 oz (107.6 kg)   03/04/20 (!) 248 lb (112.5 kg)     The patient is alert and oriented times three. Sclerae are anicteric. Mucosal membranes are moist. Jugular venous pressure is normal. No significant adenopathy/thyromegally appreciated. Lungs are clear with good expansion. On cardiovascular exam, the patient has a regular S1 and S2. Abdomen is soft and non-tender. Extremities reveal no clubbing, cyanosis, or edema.         Family History/Social History/Risk Factors     Patient does not smoke.  Family history reviewed, and family history includes Hypertension " in his father.          Medications  Allergies   Current Outpatient Medications   Medication Sig Dispense Refill   ? amLODIPine (NORVASC) 10 MG tablet Take 10 mg by mouth daily.     ? aspirin 325 MG tablet Take 325 mg by mouth every evening.      ? atorvastatin (LIPITOR) 80 MG tablet Take 1 tablet (80 mg total) by mouth at bedtime. 90 tablet 0   ? cholecalciferol, vitamin D3, 5,000 unit Tab Take 5,000 Units by mouth daily.      ? coenzyme Q10 75 mg cap Take 1 capsule by mouth daily.     ? DOCOSAHEXANOIC ACID/EPA (FISH OIL ORAL) Take by mouth. 1360 mg  Take 1 Capsule Daily     ? ginkgo biloba 40 mg Tab Take 40 mg by mouth daily.      ? multivitamin therapeutic (THERAGRAN) tablet Take 1 tablet by mouth daily.     ? tamsulosin (FLOMAX) 0.4 mg cap Take 1 capsule by mouth daily.     ? vit C-vit E-copper-zinc-lutein (PRESERVISION LUTEIN) 226 mg-200 unit -5 mg-0.8 mg cap Take 1 capsule by mouth 2 (two) times a day.     ? acetaminophen (TYLENOL) 325 MG tablet Take 2 tablets (650 mg total) by mouth every 4 (four) hours as needed.  0   ? hyaluronate sodium (HYALURONIC ACID, SODIUM, ORAL) Take 1 capsule by mouth daily.     ? HYDROcodone-acetaminophen 5-325 mg per tablet Take 1 tablet by mouth every 4 (four) hours as needed for pain. 20 tablet 0   ? senna-docusate (PERICOLACE) 8.6-50 mg tablet Take 1 tablet by mouth 2 (two) times a day.  0   ? zinc gluconate 30 mg Tab Take 30 mg by mouth daily.        No current facility-administered medications for this visit.       No Known Allergies       Lab Results   Lab Results   Component Value Date     01/14/2021    K 4.7 01/14/2021     (H) 01/14/2021    CO2 21 (L) 01/14/2021    BUN 23 01/14/2021    CREATININE 1.08 01/14/2021    CALCIUM 9.6 01/14/2021     Lab Results   Component Value Date    WBC 5.5 05/24/2020    HGB 11.3 (L) 05/24/2020    HCT 32.5 (L) 05/24/2020    MCV 88 05/24/2020     (L) 05/24/2020     Lab Results   Component Value Date    CHOL 149 01/14/2021     TRIG 84 01/14/2021    HDL 61 01/14/2021    LDLCALC 71 01/14/2021     Lab Results   Component Value Date    INR 1.52 (H) 01/08/2013     Lab Results   Component Value Date    CKTOTAL 103 05/22/2020    TROPONINI <0.01 05/22/2020

## 2021-10-11 ENCOUNTER — LAB REQUISITION (OUTPATIENT)
Dept: LAB | Facility: CLINIC | Age: 76
End: 2021-10-11

## 2021-10-11 DIAGNOSIS — D64.9 ANEMIA, UNSPECIFIED: ICD-10-CM

## 2021-10-11 LAB
FERRITIN SERPL-MCNC: 162 NG/ML (ref 27–300)
FOLATE SERPL-MCNC: 11.9 NG/ML
IRON SATN MFR SERPL: 32 % (ref 20–50)
IRON SERPL-MCNC: 101 UG/DL (ref 42–175)
TIBC SERPL-MCNC: 320 UG/DL (ref 313–563)
TRANSFERRIN SERPL-MCNC: 256 MG/DL (ref 212–360)
VIT B12 SERPL-MCNC: 392 PG/ML (ref 213–816)

## 2021-10-11 PROCEDURE — 82607 VITAMIN B-12: CPT | Performed by: PHYSICIAN ASSISTANT

## 2021-10-11 PROCEDURE — 82746 ASSAY OF FOLIC ACID SERUM: CPT | Performed by: PHYSICIAN ASSISTANT

## 2021-10-11 PROCEDURE — 82728 ASSAY OF FERRITIN: CPT | Performed by: PHYSICIAN ASSISTANT

## 2021-10-11 PROCEDURE — 84466 ASSAY OF TRANSFERRIN: CPT | Performed by: PHYSICIAN ASSISTANT

## 2021-10-16 ENCOUNTER — HEALTH MAINTENANCE LETTER (OUTPATIENT)
Age: 76
End: 2021-10-16

## 2021-11-12 DIAGNOSIS — E78.5 DYSLIPIDEMIA: ICD-10-CM

## 2021-11-12 RX ORDER — ATORVASTATIN CALCIUM 80 MG/1
80 TABLET, FILM COATED ORAL AT BEDTIME
Qty: 90 TABLET | Refills: 1 | Status: SHIPPED | OUTPATIENT
Start: 2021-11-12

## 2022-01-18 ENCOUNTER — LAB REQUISITION (OUTPATIENT)
Dept: LAB | Facility: CLINIC | Age: 77
End: 2022-01-18

## 2022-01-18 DIAGNOSIS — I10 ESSENTIAL (PRIMARY) HYPERTENSION: ICD-10-CM

## 2022-01-18 DIAGNOSIS — I25.10 ATHEROSCLEROTIC HEART DISEASE OF NATIVE CORONARY ARTERY WITHOUT ANGINA PECTORIS: ICD-10-CM

## 2022-01-18 DIAGNOSIS — Z85.46 PERSONAL HISTORY OF MALIGNANT NEOPLASM OF PROSTATE: ICD-10-CM

## 2022-01-18 DIAGNOSIS — E78.5 HYPERLIPIDEMIA, UNSPECIFIED: ICD-10-CM

## 2022-01-18 LAB
ALBUMIN SERPL-MCNC: 3.9 G/DL (ref 3.5–5)
ALP SERPL-CCNC: 109 U/L (ref 45–120)
ALT SERPL W P-5'-P-CCNC: 29 U/L (ref 0–45)
ANION GAP SERPL CALCULATED.3IONS-SCNC: 11 MMOL/L (ref 5–18)
AST SERPL W P-5'-P-CCNC: 20 U/L (ref 0–40)
BILIRUB SERPL-MCNC: 0.7 MG/DL (ref 0–1)
BUN SERPL-MCNC: 20 MG/DL (ref 8–28)
CALCIUM SERPL-MCNC: 9.3 MG/DL (ref 8.5–10.5)
CHLORIDE BLD-SCNC: 106 MMOL/L (ref 98–107)
CHOLEST SERPL-MCNC: 156 MG/DL
CO2 SERPL-SCNC: 24 MMOL/L (ref 22–31)
CREAT SERPL-MCNC: 1.01 MG/DL (ref 0.7–1.3)
FASTING STATUS PATIENT QL REPORTED: NORMAL
GFR SERPL CREATININE-BSD FRML MDRD: 78 ML/MIN/1.73M2
GLUCOSE BLD-MCNC: 98 MG/DL (ref 70–125)
HDLC SERPL-MCNC: 58 MG/DL
LDLC SERPL CALC-MCNC: 79 MG/DL
POTASSIUM BLD-SCNC: 4.1 MMOL/L (ref 3.5–5)
PROT SERPL-MCNC: 6 G/DL (ref 6–8)
PSA SERPL-MCNC: 7.59 UG/L (ref 0–6.5)
SODIUM SERPL-SCNC: 141 MMOL/L (ref 136–145)
TRIGL SERPL-MCNC: 97 MG/DL

## 2022-01-18 PROCEDURE — 80061 LIPID PANEL: CPT | Performed by: FAMILY MEDICINE

## 2022-01-18 PROCEDURE — 84155 ASSAY OF PROTEIN SERUM: CPT | Performed by: FAMILY MEDICINE

## 2022-01-18 PROCEDURE — 84153 ASSAY OF PSA TOTAL: CPT | Performed by: FAMILY MEDICINE

## 2022-01-18 PROCEDURE — 82947 ASSAY GLUCOSE BLOOD QUANT: CPT | Performed by: FAMILY MEDICINE

## 2022-07-15 ENCOUNTER — HOSPITAL ENCOUNTER (EMERGENCY)
Facility: HOSPITAL | Age: 77
Discharge: HOME | End: 2022-07-15
Attending: EMERGENCY MEDICINE | Admitting: EMERGENCY MEDICINE
Payer: COMMERCIAL

## 2022-07-15 ENCOUNTER — APPOINTMENT (OUTPATIENT)
Dept: CT IMAGING | Facility: HOSPITAL | Age: 77
End: 2022-07-15
Attending: EMERGENCY MEDICINE
Payer: COMMERCIAL

## 2022-07-15 VITALS
OXYGEN SATURATION: 98 % | WEIGHT: 246 LBS | SYSTOLIC BLOOD PRESSURE: 189 MMHG | TEMPERATURE: 97.4 F | BODY MASS INDEX: 32.6 KG/M2 | HEIGHT: 73 IN | RESPIRATION RATE: 18 BRPM | DIASTOLIC BLOOD PRESSURE: 90 MMHG | HEART RATE: 66 BPM

## 2022-07-15 DIAGNOSIS — N20.0 RECURRENT NEPHROLITHIASIS: ICD-10-CM

## 2022-07-15 PROBLEM — R39.198 SLOWING OF URINARY STREAM: Status: ACTIVE | Noted: 2021-03-31

## 2022-07-15 PROBLEM — R31.0 FRANK HEMATURIA: Status: ACTIVE | Noted: 2021-03-31

## 2022-07-15 LAB
ALBUMIN SERPL-MCNC: 4 G/DL (ref 3.5–5)
ALBUMIN UR-MCNC: NEGATIVE MG/DL
ALP SERPL-CCNC: 94 U/L (ref 45–120)
ALT SERPL W P-5'-P-CCNC: 27 U/L (ref 0–45)
ANION GAP SERPL CALCULATED.3IONS-SCNC: 6 MMOL/L (ref 5–18)
APPEARANCE UR: CLEAR
AST SERPL W P-5'-P-CCNC: 24 U/L (ref 0–40)
BACTERIA #/AREA URNS HPF: ABNORMAL /HPF
BASOPHILS # BLD AUTO: 0 10E3/UL (ref 0–0.2)
BASOPHILS NFR BLD AUTO: 0 %
BILIRUB SERPL-MCNC: 0.7 MG/DL (ref 0–1)
BILIRUB UR QL STRIP: NEGATIVE
BUN SERPL-MCNC: 29 MG/DL (ref 8–28)
CALCIUM SERPL-MCNC: 9.5 MG/DL (ref 8.5–10.5)
CHLORIDE BLD-SCNC: 108 MMOL/L (ref 98–107)
CO2 SERPL-SCNC: 28 MMOL/L (ref 22–31)
COLOR UR AUTO: COLORLESS
CREAT SERPL-MCNC: 1.26 MG/DL (ref 0.7–1.3)
EOSINOPHIL # BLD AUTO: 0 10E3/UL (ref 0–0.7)
EOSINOPHIL NFR BLD AUTO: 0 %
ERYTHROCYTE [DISTWIDTH] IN BLOOD BY AUTOMATED COUNT: 12.8 % (ref 10–15)
GFR SERPL CREATININE-BSD FRML MDRD: 60 ML/MIN/1.73M2
GLUCOSE BLD-MCNC: 112 MG/DL (ref 70–125)
GLUCOSE UR STRIP-MCNC: NEGATIVE MG/DL
HCT VFR BLD AUTO: 41.3 % (ref 40–53)
HGB BLD-MCNC: 14.1 G/DL (ref 13.3–17.7)
HGB UR QL STRIP: NEGATIVE
IMM GRANULOCYTES # BLD: 0 10E3/UL
IMM GRANULOCYTES NFR BLD: 0 %
KETONES UR STRIP-MCNC: NEGATIVE MG/DL
LEUKOCYTE ESTERASE UR QL STRIP: NEGATIVE
LYMPHOCYTES # BLD AUTO: 0.8 10E3/UL (ref 0.8–5.3)
LYMPHOCYTES NFR BLD AUTO: 9 %
MCH RBC QN AUTO: 30.9 PG (ref 26.5–33)
MCHC RBC AUTO-ENTMCNC: 34.1 G/DL (ref 31.5–36.5)
MCV RBC AUTO: 91 FL (ref 78–100)
MONOCYTES # BLD AUTO: 0.8 10E3/UL (ref 0–1.3)
MONOCYTES NFR BLD AUTO: 9 %
NEUTROPHILS # BLD AUTO: 7.7 10E3/UL (ref 1.6–8.3)
NEUTROPHILS NFR BLD AUTO: 82 %
NITRATE UR QL: NEGATIVE
NRBC # BLD AUTO: 0 10E3/UL
NRBC BLD AUTO-RTO: 0 /100
PH UR STRIP: 5.5 [PH] (ref 5–7)
PLATELET # BLD AUTO: 167 10E3/UL (ref 150–450)
POTASSIUM BLD-SCNC: 4.4 MMOL/L (ref 3.5–5)
PROT SERPL-MCNC: 6.5 G/DL (ref 6–8)
RBC # BLD AUTO: 4.56 10E6/UL (ref 4.4–5.9)
RBC URINE: 2 /HPF
SODIUM SERPL-SCNC: 142 MMOL/L (ref 136–145)
SP GR UR STRIP: 1.01 (ref 1–1.03)
UROBILINOGEN UR STRIP-MCNC: <2 MG/DL
WBC # BLD AUTO: 9.4 10E3/UL (ref 4–11)
WBC URINE: 1 /HPF

## 2022-07-15 PROCEDURE — 96361 HYDRATE IV INFUSION ADD-ON: CPT

## 2022-07-15 PROCEDURE — 96375 TX/PRO/DX INJ NEW DRUG ADDON: CPT

## 2022-07-15 PROCEDURE — 81001 URINALYSIS AUTO W/SCOPE: CPT | Performed by: EMERGENCY MEDICINE

## 2022-07-15 PROCEDURE — 258N000003 HC RX IP 258 OP 636: Performed by: EMERGENCY MEDICINE

## 2022-07-15 PROCEDURE — 96374 THER/PROPH/DIAG INJ IV PUSH: CPT

## 2022-07-15 PROCEDURE — 250N000013 HC RX MED GY IP 250 OP 250 PS 637: Performed by: EMERGENCY MEDICINE

## 2022-07-15 PROCEDURE — 250N000011 HC RX IP 250 OP 636: Performed by: EMERGENCY MEDICINE

## 2022-07-15 PROCEDURE — 74176 CT ABD & PELVIS W/O CONTRAST: CPT

## 2022-07-15 PROCEDURE — 85025 COMPLETE CBC W/AUTO DIFF WBC: CPT | Performed by: EMERGENCY MEDICINE

## 2022-07-15 PROCEDURE — 36415 COLL VENOUS BLD VENIPUNCTURE: CPT | Performed by: EMERGENCY MEDICINE

## 2022-07-15 PROCEDURE — 80053 COMPREHEN METABOLIC PANEL: CPT | Performed by: EMERGENCY MEDICINE

## 2022-07-15 PROCEDURE — 82040 ASSAY OF SERUM ALBUMIN: CPT | Performed by: EMERGENCY MEDICINE

## 2022-07-15 PROCEDURE — 99285 EMERGENCY DEPT VISIT HI MDM: CPT | Mod: 25

## 2022-07-15 RX ORDER — ONDANSETRON 2 MG/ML
4 INJECTION INTRAMUSCULAR; INTRAVENOUS ONCE
Status: COMPLETED | OUTPATIENT
Start: 2022-07-15 | End: 2022-07-15

## 2022-07-15 RX ORDER — ACETAMINOPHEN 325 MG/1
650 TABLET ORAL ONCE
Status: COMPLETED | OUTPATIENT
Start: 2022-07-15 | End: 2022-07-15

## 2022-07-15 RX ORDER — KETOROLAC TROMETHAMINE 15 MG/ML
15 INJECTION, SOLUTION INTRAMUSCULAR; INTRAVENOUS ONCE
Status: COMPLETED | OUTPATIENT
Start: 2022-07-15 | End: 2022-07-15

## 2022-07-15 RX ORDER — ACETAMINOPHEN 500 MG
1000 TABLET ORAL EVERY 6 HOURS
Qty: 56 TABLET | Refills: 0 | Status: SHIPPED | OUTPATIENT
Start: 2022-07-15 | End: 2022-07-22

## 2022-07-15 RX ORDER — DIMENHYDRINATE 50 MG
50 TABLET ORAL EVERY 6 HOURS PRN
Qty: 28 TABLET | Refills: 0 | Status: SHIPPED | OUTPATIENT
Start: 2022-07-15 | End: 2022-07-22

## 2022-07-15 RX ORDER — IBUPROFEN 200 MG
400 TABLET ORAL EVERY 6 HOURS
Qty: 56 TABLET | Refills: 0 | Status: SHIPPED | OUTPATIENT
Start: 2022-07-15 | End: 2022-07-22

## 2022-07-15 RX ADMIN — KETOROLAC TROMETHAMINE 15 MG: 15 INJECTION, SOLUTION INTRAMUSCULAR; INTRAVENOUS at 12:33

## 2022-07-15 RX ADMIN — ACETAMINOPHEN 650 MG: 325 TABLET ORAL at 12:27

## 2022-07-15 RX ADMIN — SODIUM CHLORIDE 1000 ML: 9 INJECTION, SOLUTION INTRAVENOUS at 12:38

## 2022-07-15 RX ADMIN — ONDANSETRON 4 MG: 2 INJECTION INTRAMUSCULAR; INTRAVENOUS at 12:36

## 2022-07-15 NOTE — ED PROVIDER NOTES
EMERGENCY DEPARTMENT ENCOUNTER      NAME: Jc Balderrama Sr.  AGE: 76 year old male  YOB: 1945  MRN: [id number]  EVALUATION DATE & TIME: No admission date for patient encounter.    PCP: Ashwin Yeh    ED PROVIDER: Tasneem Mann M.D.      No chief complaint on file.        FINAL IMPRESSION:  1. Recurrent nephrolithiasis          ED COURSE & MEDICAL DECISION MAKING:    ED Course as of 07/15/22 1317   Fri Jul 15, 2022   1301 Patient clinically reassessed and feels much improved, eager for discharge and better after ketorolac in the ED. CBC and CMP WNL and UA WNL also, which is reassuring with kidney stone present with his urologist retired. Patient discharged after being provided with extensive anticipatory guidance and given return precautions, importance of PMD follow-up emphasized. Patient given KSI follow up.       Pertinent Labs & Imaging studies reviewed. (See chart for details)    N95 worn  A face shield was worn also  COVID PPE      At the conclusion of the encounter I discussed the results of all of the tests and the disposition. The questions were answered. The patient or family acknowledged understanding and was agreeable with the care plan.     MEDICATIONS GIVEN IN THE EMERGENCY:  Medications   0.9% sodium chloride BOLUS (1,000 mLs Intravenous New Bag 7/15/22 1238)   ketorolac (TORADOL) injection 15 mg (15 mg Intravenous Given 7/15/22 1233)   ondansetron (ZOFRAN) injection 4 mg (4 mg Intravenous Given 7/15/22 1236)   acetaminophen (TYLENOL) tablet 650 mg (650 mg Oral Given 7/15/22 1227)       NEW PRESCRIPTIONS STARTED AT TODAY'S ER VISIT  New Prescriptions    ACETAMINOPHEN (TYLENOL) 500 MG TABLET    Take 2 tablets (1,000 mg) by mouth every 6 hours for 7 days    DIMENHYDRINATE (DRAMAMINE) 50 MG TABLET    Take 1 tablet (50 mg) by mouth every 6 hours as needed for other (kidney stone pain management)    IBUPROFEN (ADVIL/MOTRIN) 200 MG TABLET    Take 2 tablets (400 mg) by mouth every 6 hours  for 7 days          =================================================================    HPI      Jc Balderrama Sr. is a 76 year old male with PMHx of prostate cancer and kidney stone who presents to the ED today via walk in with right sided flank pain.     Patient reports that he has a history kidney stones and he thinks that he is currently passing one. Patient reports that at the end of last week he had onset of right sided flank pain. Today when he woke up, this pain was significantly worse so he took 1000mg of ibuprofen. Typically when he has kidney stones ibuprofen improves the pain, but today it did not help his pain at all. Patient reports associated nausea. His flank pain radiates to his perineum which is what happened last time he passed a kidney stone. He describes the pain as dull and rates it 8/10. Patient took his normal prescription medications today but did not take anything else for his symptoms. Denies vomiting, fever, hematuria, chest pain, shortness of breath, or any other complaints at this time.     REVIEW OF SYSTEMS   All other systems reviewed and are negative except as noted above in HPI.    PAST MEDICAL HISTORY:  Past Medical History:   Diagnosis Date     Cancer (H)     prostate     Coronary artery disease      Hyperlipidemia      Hypertension      Ureterolithiasis        PAST SURGICAL HISTORY:  Past Surgical History:   Procedure Laterality Date     APPENDECTOMY       BYPASS GRAFT ARTERY CORONARY       LITHOTRIPSY         CURRENT MEDICATIONS:    acetaminophen (TYLENOL) 500 MG tablet  dimenhyDRINATE (DRAMAMINE) 50 MG tablet  ibuprofen (ADVIL/MOTRIN) 200 MG tablet  amLODIPine (NORVASC) 10 MG tablet  aspirin 325 MG tablet  atorvastatin (LIPITOR) 80 MG tablet  cholecalciferol, vitamin D3, 5,000 unit Tab  DOCOSAHEXANOIC ACID/EPA (FISH OIL ORAL)  ginkgo biloba 40 mg Tab  hyaluronate sodium (HYALURONIC ACID, SODIUM, ORAL)  HYDROcodone-acetaminophen 5-325 mg per tablet  MEDICATION CANNOT BE  "REORDERED - PLEASE MANUALLY REORDER AND DISCONTINUE THE OLD ORDER  multivitamin therapeutic (THERAGRAN) tablet  oxyCODONE-acetaminophen (PERCOCET/ENDOCET) 5-325 mg per tablet  vit C-vit E-copper-zinc-lutein (PRESERVISION LUTEIN) 226 mg-200 unit -5 mg-0.8 mg cap        ALLERGIES:  No Known Allergies    FAMILY HISTORY:  Family History   Problem Relation Age of Onset     Multiple myeloma Father      Lung Cancer Mother      Prostate Cancer Brother        SOCIAL HISTORY:   Social History     Socioeconomic History     Marital status:      Spouse name: None     Number of children: 1     Years of education: 17     Highest education level: None   Tobacco Use     Smoking status: Never Smoker     Smokeless tobacco: Never Used   Substance and Sexual Activity     Alcohol use: Yes     Alcohol/week: 3.0 standard drinks     Drug use: Never     Sexual activity: Not Currently       VITALS:  Patient Vitals for the past 24 hrs:   BP Temp Temp src Pulse Resp SpO2 Height Weight   07/15/22 1234 (!) 176/90 97.4  F (36.3  C) Temporal 67 16 99 % 1.854 m (6' 1\") 111.6 kg (246 lb)       PHYSICAL EXAM    GENERAL: Awake, alert.  In no acute distress.   HEENT: Normocephalic, atraumatic.  Pupils equal, round and reactive.  Conjunctiva normal.  EOMI.  NECK: No stridor or apparent deformity.  PULMONARY: Symmetrical breath sounds without distress.  Lungs clear to auscultation bilaterally without wheezes, rhonchi or rales.  CARDIO: Regular rate and rhythm.  No significant murmur, rub or gallop.  Radial pulses strong and symmetrical.  ABDOMINAL: Abdomen soft, non-distended and non-tender to palpation. No reproducible abdominal pain with palpation. No CVAT, no palpable hepatosplenomegaly.  EXTREMITIES: No lower extremity swelling or edema.    NEURO: Alert and oriented to person, place and time.  Cranial nerves grossly intact.  No focal motor deficit.  PSYCH: Normal mood and affect  SKIN: No rashes      LAB:  All pertinent labs reviewed and " interpreted.  Results for orders placed or performed during the hospital encounter of 07/15/22   CT Abdomen Pelvis w/o Contrast    Impression    IMPRESSION: Moderate right hydroureteronephrosis secondary to tandem stones involving the distal right ureter with the largest measuring 4 mm in greatest dimension within the ureterovesical junction.     Comprehensive metabolic panel   Result Value Ref Range    Sodium 142 136 - 145 mmol/L    Potassium 4.4 3.5 - 5.0 mmol/L    Chloride 108 (H) 98 - 107 mmol/L    Carbon Dioxide (CO2) 28 22 - 31 mmol/L    Anion Gap 6 5 - 18 mmol/L    Urea Nitrogen 29 (H) 8 - 28 mg/dL    Creatinine 1.26 0.70 - 1.30 mg/dL    Calcium 9.5 8.5 - 10.5 mg/dL    Glucose 112 70 - 125 mg/dL    Alkaline Phosphatase 94 45 - 120 U/L    AST 24 0 - 40 U/L    ALT 27 0 - 45 U/L    Protein Total 6.5 6.0 - 8.0 g/dL    Albumin 4.0 3.5 - 5.0 g/dL    Bilirubin Total 0.7 0.0 - 1.0 mg/dL    GFR Estimate 60 (L) >60 mL/min/1.73m2   UA with Microscopic reflex to Culture    Specimen: Urine, Midstream   Result Value Ref Range    Color Urine Colorless Colorless, Straw, Light Yellow, Yellow    Appearance Urine Clear Clear    Glucose Urine Negative Negative mg/dL    Bilirubin Urine Negative Negative    Ketones Urine Negative Negative mg/dL    Specific Gravity Urine 1.013 1.001 - 1.030    Blood Urine Negative Negative    pH Urine 5.5 5.0 - 7.0    Protein Albumin Urine Negative Negative mg/dL    Urobilinogen Urine <2.0 <2.0 mg/dL    Nitrite Urine Negative Negative    Leukocyte Esterase Urine Negative Negative    Bacteria Urine Few (A) None Seen /HPF    RBC Urine 2 <=2 /HPF    WBC Urine 1 <=5 /HPF   CBC with platelets and differential   Result Value Ref Range    WBC Count 9.4 4.0 - 11.0 10e3/uL    RBC Count 4.56 4.40 - 5.90 10e6/uL    Hemoglobin 14.1 13.3 - 17.7 g/dL    Hematocrit 41.3 40.0 - 53.0 %    MCV 91 78 - 100 fL    MCH 30.9 26.5 - 33.0 pg    MCHC 34.1 31.5 - 36.5 g/dL    RDW 12.8 10.0 - 15.0 %    Platelet Count 167 150  - 450 10e3/uL    % Neutrophils 82 %    % Lymphocytes 9 %    % Monocytes 9 %    % Eosinophils 0 %    % Basophils 0 %    % Immature Granulocytes 0 %    NRBCs per 100 WBC 0 <1 /100    Absolute Neutrophils 7.7 1.6 - 8.3 10e3/uL    Absolute Lymphocytes 0.8 0.8 - 5.3 10e3/uL    Absolute Monocytes 0.8 0.0 - 1.3 10e3/uL    Absolute Eosinophils 0.0 0.0 - 0.7 10e3/uL    Absolute Basophils 0.0 0.0 - 0.2 10e3/uL    Absolute Immature Granulocytes 0.0 <=0.4 10e3/uL    Absolute NRBCs 0.0 10e3/uL       RADIOLOGY:  Reviewed all pertinent imaging. Please see official radiology report.  CT Abdomen Pelvis w/o Contrast   Final Result   IMPRESSION: Moderate right hydroureteronephrosis secondary to tandem stones involving the distal right ureter with the largest measuring 4 mm in greatest dimension within the ureterovesical junction.               I, Farhan Espitia, am serving as a scribe to document services personally performed by Dr. Tasneem Mann based on my observation and the provider's statements to me. I, Tasneem Mann MD attest that Farhan Espitia is acting in a scribe capacity, has observed my performance of the services and has documented them in accordance with my direction.     Tasneem Mann MD  07/15/22 9405

## 2022-07-17 ENCOUNTER — HEALTH MAINTENANCE LETTER (OUTPATIENT)
Age: 77
End: 2022-07-17

## 2022-07-18 ENCOUNTER — TELEPHONE (OUTPATIENT)
Dept: UROLOGY | Facility: CLINIC | Age: 77
End: 2022-07-18

## 2022-07-18 NOTE — TELEPHONE ENCOUNTER
Spoke with patient who is feeling well today.  He does not wish to schedule an appointment at this time.  Education given regarding medications and number to call with any questions or to schedule an appt.  Corie Bojorquez RN

## 2022-08-25 ENCOUNTER — OFFICE VISIT (OUTPATIENT)
Dept: CARDIOLOGY | Facility: CLINIC | Age: 77
End: 2022-08-25
Payer: COMMERCIAL

## 2022-08-25 VITALS
HEART RATE: 68 BPM | HEIGHT: 73 IN | BODY MASS INDEX: 33.78 KG/M2 | WEIGHT: 254.9 LBS | SYSTOLIC BLOOD PRESSURE: 132 MMHG | RESPIRATION RATE: 12 BRPM | TEMPERATURE: 98 F | DIASTOLIC BLOOD PRESSURE: 66 MMHG | OXYGEN SATURATION: 98 %

## 2022-08-25 DIAGNOSIS — I10 HYPERTENSION, UNSPECIFIED TYPE: ICD-10-CM

## 2022-08-25 DIAGNOSIS — I25.10 CORONARY ARTERY DISEASE INVOLVING NATIVE CORONARY ARTERY WITHOUT ANGINA PECTORIS, UNSPECIFIED WHETHER NATIVE OR TRANSPLANTED HEART: ICD-10-CM

## 2022-08-25 DIAGNOSIS — E78.5 DYSLIPIDEMIA: ICD-10-CM

## 2022-08-25 DIAGNOSIS — R07.2 PRECORDIAL PAIN: Primary | ICD-10-CM

## 2022-08-25 PROCEDURE — 99214 OFFICE O/P EST MOD 30 MIN: CPT | Performed by: INTERNAL MEDICINE

## 2022-08-25 RX ORDER — GENTAMICIN 40 MG/ML
INJECTION, SOLUTION INTRAMUSCULAR; INTRAVENOUS
COMMUNITY

## 2022-08-25 RX ORDER — ABIRATERONE ACETATE 250 MG/1
4 TABLET ORAL EVERY MORNING
COMMUNITY

## 2022-08-25 RX ORDER — VALSARTAN 160 MG/1
TABLET ORAL
COMMUNITY
Start: 2022-06-21

## 2022-08-25 RX ORDER — PREDNISONE 5 MG/1
TABLET ORAL
COMMUNITY
Start: 2022-06-21

## 2022-08-25 RX ORDER — ACETAMINOPHEN 500 MG
TABLET ORAL
COMMUNITY

## 2022-08-25 RX ORDER — IBUPROFEN 200 MG
TABLET ORAL
COMMUNITY

## 2022-08-25 NOTE — LETTER
8/25/2022    Ashwin Yeh MD  [facility] [address]  [place] [address]    RE: Jc Balderrama Sr.       Dear Colleague,     I had the pleasure of seeing Jc Balderrama Sr. in the [facility].         [facility] [place] [address], [place] [address]   [email or web address]   OFFICE: [phone]          Thank you Ashwin Lobo for asking the [facility] team to participate in the care of your patient, Jc Gradyroxann Hale.     Impression and Plan     1. Coronary artery disease. ?Jc has known coronary artery disease. ?Specifically, Jc underwent coronary artery bypass graft surgery 8 January 2013, at which time, he had successful bypass with YESSENIA graft to the LAD, saphenous vein graft to the PDA-PL sequentially, and saphenous vein graft to the ramus to obtuse marginal No. 1. &?sequentially to obtuse marginal No. 2.   ?   Stress echocardiogram 19 March 2020 was favorable and revealed no concerning findings.   ?   As noted below, Hernandez does report some symptoms of intermittent chest discomfort though some features a bit atypical.  At times he can do his regular workout routine without any issues.  Cannot entirely discount possible ischemic contribution to some of his symptom profile.  Plan:    Stress echocardiogram.  ?   2.  Hypertension.   Blood pressure is fairly reasonable in the office today.  ?   3. Dyslipidemia. ?  Lipid profile 18 January 2022 was fairly close to target with LDL 79 mg/dL and HDL 58 mg/dL.     Continue statin therapy.  ?   Follow-up and further recommendations pending stress test results.    35 minutes spent reviewing prior records (including documentation, laboratory studies, cardiac testing/imaging), interview with patient along with physical exam, planning, and subsequent documentation/crafting of note).           History of Present Illness    Once again I would like to thank you again for  asking me to participate in the care of your patient, Jc Balderrama Sr..  As you know, but to reiterate for my own records, Jc Balderrama Sr. is a 77 year old male with known coronary disease. He underwent coronary artery bypass graft surgery 8 January 2013, at which time, he had successful bypass with YESSENIA graft to the LAD, saphenous vein graft to the PDA-PL sequentially, and saphenous vein graft to the ramus to obtuse marginal No. 1. & sequentially to obtuse marginal No. 2.   ?   In follow-up today, Hernandez does report some intermittent chest discomfort though somewhat dissimilar from when he was originally diagnosed with coronary disease 9  years ago.  At times it does seem to be brought on with certain activities though certainly not in a consistent fashion.  He does workout regularly and commonly can do his workout routine without any issues at all.  He does admit, however, that he has been under multiple stressors including some issues at home and also in relation to his prostate cancer diagnosis.  He thinks this may be a contributor somewhat to some of the symptoms that he is experiencing.    Further review of systems is otherwise negative/noncontributory (medical record and 13 point review of systems reviewed as well and pertinent positives noted).         Cardiac Diagnostics      Stress echocardiogram 19 March 2020:   1. No echocardiographic evidence of ischemia.   2. Normal resting left ventricular systolic performance with ejection fraction of 60%.   3. No ECG evidence of ischemia.   4. Normal functional capacity.   ?   Exercise nuclear perfusion study 25 March 2016:   1. No evidence of infarct or ischemia.   2. Normal left ventricular systolic performance with ejection fraction of 63%.   ?   Echocardiogram 8 January 2013 (intraoperative RALF):   1. Normal left ventricular size and systolic performance.   2. Moderate concentric increase in thickness.   3. No significant valvular heart disease.       "      Physical Examination       /66 (BP Location: Left arm, Patient Position: Sitting, Cuff Size: Adult Large)   Pulse 68   Temp 98  F (36.7  C) (Oral)   Resp 12   Ht 1.854 m (6' 1\")   Wt 115.6 kg (254 lb 14.4 oz)   SpO2 98%   BMI 33.63 kg/m          Wt Readings from Last 3 Encounters:   08/25/22 115.6 kg (254 lb 14.4 oz)   04/20/21 108 kg (238 lb)   03/23/21 110.2 kg (243 lb)     The patient is alert and oriented times three. Sclerae are anicteric. Mucosal membranes are moist. Jugular venous pressure is normal. No significant adenopathy/thyromegally appreciated. Lungs are clear with good expansion. On cardiovascular exam, the patient has a regular S1 and S2. Abdomen is soft and non-tender. Extremities reveal no clubbing, cyanosis, or edema.       Medications  Allergies   Current Outpatient Medications   Medication Sig Dispense Refill     abiraterone (ZYTIGA) 250 MG tablet Take 4 tablets by mouth every morning       acetaminophen (TYLENOL) 500 MG tablet acetaminophen 500 mg tablet   TAKE 2 TABLETS BY MOUTH EVERY 6 HOURS FOR 7 DAYS       amLODIPine (NORVASC) 10 MG tablet [AMLODIPINE (NORVASC) 10 MG TABLET] Take 10 mg by mouth daily.       aspirin 325 MG tablet [ASPIRIN 325 MG TABLET] Take 325 mg by mouth every evening.        atorvastatin (LIPITOR) 80 MG tablet Take 1 tablet (80 mg) by mouth At Bedtime 90 tablet 1     cholecalciferol, vitamin D3, 5,000 unit Tab [CHOLECALCIFEROL, VITAMIN D3, 5,000 UNIT TAB] Take 5,000 Units by mouth daily.        coenzyme Q-10 75 MG CAPS Take 1 capsule by mouth daily       DOCOSAHEXANOIC ACID/EPA (FISH OIL ORAL) [DOCOSAHEXANOIC ACID/EPA (FISH OIL ORAL)] Take by mouth. 1360 mg  Take 1 Capsule Daily       gentamicin (GARAMYCIN) 40 MG/ML injection gentamicin 40 mg/mL injection solution   Gentamicin       ginkgo biloba 40 mg Tab [GINKGO BILOBA 40 MG TAB] Take 40 mg by mouth daily.        ibuprofen (ADVIL/MOTRIN) 200 MG tablet ibuprofen 200 mg tablet   TAKE 2 TABLETS BY MOUTH " EVERY 6 HOURS FOR 7 DAYS       multivitamin therapeutic (THERAGRAN) tablet [MULTIVITAMIN THERAPEUTIC (THERAGRAN) TABLET] Take 1 tablet by mouth daily.       predniSONE (DELTASONE) 5 MG tablet prednisone 5 mg tablet   TAKE 1 TABLET BY MOUTH EVERY DAY       valsartan (DIOVAN) 160 MG tablet valsartan 160 mg tablet   TAKE 1 TABLET BY MOUTH EVERY DAY       vit C-vit E-copper-zinc-lutein (PRESERVISION LUTEIN) 226 mg-200 unit -5 mg-0.8 mg cap [VIT C-VIT E-COPPER-ZINC-LUTEIN (PRESERVISION LUTEIN) 226 MG-200 UNIT -5 MG-0.8 MG CAP] Take 1 capsule by mouth 2 (two) times a day.       hyaluronate sodium (HYALURONIC ACID, SODIUM, ORAL) [HYALURONATE SODIUM (HYALURONIC ACID, SODIUM, ORAL)] Take 1 capsule by mouth daily.       HYDROcodone-acetaminophen 5-325 mg per tablet [HYDROCODONE-ACETAMINOPHEN 5-325 MG PER TABLET] Take 1 tablet by mouth every 4 (four) hours as needed for pain. 20 tablet 0     MEDICATION CANNOT BE REORDERED - PLEASE MANUALLY REORDER AND DISCONTINUE THE OLD ORDER [COENZYME Q10 75 MG CAP] Take 1 capsule by mouth daily.       oxyCODONE-acetaminophen (PERCOCET/ENDOCET) 5-325 mg per tablet [OXYCODONE-ACETAMINOPHEN (PERCOCET/ENDOCET) 5-325 MG PER TABLET] Take 1 tablet by mouth every 6 (six) hours as needed for pain. 12 tablet 0     No Known Allergies       Lab Results    Chemistry/lipid CBC Cardiac Enzymes/BNP/TSH/INR   Recent Labs   Lab Test 01/18/22  0854   CHOL 156   HDL 58   LDL 79   TRIG 97     Recent Labs   Lab Test 01/18/22  0854 01/14/21  1024 01/09/20  1153   LDL 79 71 98     Recent Labs   Lab Test 07/15/22  1231      POTASSIUM 4.4   CHLORIDE 108*   CO2 28      BUN 29*   CR 1.26   GFRESTIMATED 60*   CAROLINA 9.5     Recent Labs   Lab Test 07/15/22  1231 01/18/22  0854 10/11/21  1215   CR 1.26 1.01 1.87*     No results for input(s): A1C in the last 53483 hours.       Recent Labs   Lab Test 07/15/22  1231   WBC 9.4   HGB 14.1   HCT 41.3   MCV 91        Recent Labs   Lab Test 07/15/22  1231  03/24/21 2024 05/24/20  0637   HGB 14.1 13.5* 11.3*    Recent Labs   Lab Test 05/22/20  1312   TROPONINI <0.01     No results for input(s): BNP, NTBNPI, NTBNP in the last 46656 hours.  No results for input(s): TSH in the last 60630 hours.  No results for input(s): INR in the last 56034 hours.     Medical History  Surgical History Family History Social History   Past Medical History:   Diagnosis Date     Cancer (H)     prostate     Coronary artery disease      Hyperlipidemia      Hypertension      Ureterolithiasis      Past Surgical History:   Procedure Laterality Date     APPENDECTOMY       BYPASS GRAFT ARTERY CORONARY       LITHOTRIPSY       Family History   Problem Relation Age of Onset     Multiple myeloma Father      Lung Cancer Mother      Prostate Cancer Brother         Social History     Socioeconomic History     Marital status:      Spouse name: Not on file     Number of children: 1     Years of education: 17     Highest education level: Not on file   Occupational History     Not on file   Tobacco Use     Smoking status: Never Smoker     Smokeless tobacco: Never Used   Substance and Sexual Activity     Alcohol use: Yes     Alcohol/week: 3.0 standard drinks     Drug use: Never     Sexual activity: Not Currently   Other Topics Concern     Not on file   Social History Narrative     Not on file     Social Determinants of Health     Financial Resource Strain: Not on file   Food Insecurity: Not on file   Transportation Needs: Not on file   Physical Activity: Not on file   Stress: Not on file   Social Connections: Not on file   Intimate Partner Violence: Not on file   Housing Stability: Not on file                      Thank you for allowing me to participate in the care of your patient.      Sincerely,     Joann Stevens MD     [facility]  cc:   No referring provider defined for this encounter.

## 2022-08-25 NOTE — PROGRESS NOTES
[facility] [address], [place] [address]   [email or web address]   OFFICE: [phone]          Thank you Ashwin Lobo for asking the [facility] team to participate in the care of your patient, Jc Balderrama Sr..     Impression and Plan     1. Coronary artery disease. ?Jc has known coronary artery disease. ?Specifically, Jc underwent coronary artery bypass graft surgery 8 January 2013, at which time, he had successful bypass with YESSENIA graft to the LAD, saphenous vein graft to the PDA-PL sequentially, and saphenous vein graft to the ramus to obtuse marginal No. 1. &?sequentially to obtuse marginal No. 2.   ?   Stress echocardiogram 19 March 2020 was favorable and revealed no concerning findings.   ?   As noted below, Hernandez does report some symptoms of intermittent chest discomfort though some features a bit atypical.  At times he can do his regular workout routine without any issues.  Cannot entirely discount possible ischemic contribution to some of his symptom profile.  Plan:    Stress echocardiogram.  ?   2.  Hypertension.   Blood pressure is fairly reasonable in the office today.  ?   3. Dyslipidemia. ?  Lipid profile 18 January 2022 was fairly close to target with LDL 79 mg/dL and HDL 58 mg/dL.     Continue statin therapy.  ?   Follow-up and further recommendations pending stress test results.    35 minutes spent reviewing prior records (including documentation, laboratory studies, cardiac testing/imaging), interview with patient along with physical exam, planning, and subsequent documentation/crafting of note).           History of Present Illness    Once again I would like to thank you again for asking me to participate in the care of your patient, Jc Balderrama Sr..  As you know, but to reiterate for my own records, Jc Balderrama Sr. is a 77 year old male with known coronary disease. He underwent coronary artery  "bypass graft surgery 8 January 2013, at which time, he had successful bypass with YESSENIA graft to the LAD, saphenous vein graft to the PDA-PL sequentially, and saphenous vein graft to the ramus to obtuse marginal No. 1. & sequentially to obtuse marginal No. 2.   ?   In follow-up today, Hernandez does report some intermittent chest discomfort though somewhat dissimilar from when he was originally diagnosed with coronary disease 9  years ago.  At times it does seem to be brought on with certain activities though certainly not in a consistent fashion.  He does workout regularly and commonly can do his workout routine without any issues at all.  He does admit, however, that he has been under multiple stressors including some issues at home and also in relation to his prostate cancer diagnosis.  He thinks this may be a contributor somewhat to some of the symptoms that he is experiencing.    Further review of systems is otherwise negative/noncontributory (medical record and 13 point review of systems reviewed as well and pertinent positives noted).         Cardiac Diagnostics      Stress echocardiogram 19 March 2020:   1. No echocardiographic evidence of ischemia.   2. Normal resting left ventricular systolic performance with ejection fraction of 60%.   3. No ECG evidence of ischemia.   4. Normal functional capacity.   ?   Exercise nuclear perfusion study 25 March 2016:   1. No evidence of infarct or ischemia.   2. Normal left ventricular systolic performance with ejection fraction of 63%.   ?   Echocardiogram 8 January 2013 (intraoperative RALF):   1. Normal left ventricular size and systolic performance.   2. Moderate concentric increase in thickness.   3. No significant valvular heart disease.            Physical Examination       /66 (BP Location: Left arm, Patient Position: Sitting, Cuff Size: Adult Large)   Pulse 68   Temp 98  F (36.7  C) (Oral)   Resp 12   Ht 1.854 m (6' 1\")   Wt 115.6 kg (254 lb 14.4 oz)   SpO2 " 98%   BMI 33.63 kg/m          Wt Readings from Last 3 Encounters:   08/25/22 115.6 kg (254 lb 14.4 oz)   04/20/21 108 kg (238 lb)   03/23/21 110.2 kg (243 lb)     The patient is alert and oriented times three. Sclerae are anicteric. Mucosal membranes are moist. Jugular venous pressure is normal. No significant adenopathy/thyromegally appreciated. Lungs are clear with good expansion. On cardiovascular exam, the patient has a regular S1 and S2. Abdomen is soft and non-tender. Extremities reveal no clubbing, cyanosis, or edema.       Medications  Allergies   Current Outpatient Medications   Medication Sig Dispense Refill     abiraterone (ZYTIGA) 250 MG tablet Take 4 tablets by mouth every morning       acetaminophen (TYLENOL) 500 MG tablet acetaminophen 500 mg tablet   TAKE 2 TABLETS BY MOUTH EVERY 6 HOURS FOR 7 DAYS       amLODIPine (NORVASC) 10 MG tablet [AMLODIPINE (NORVASC) 10 MG TABLET] Take 10 mg by mouth daily.       aspirin 325 MG tablet [ASPIRIN 325 MG TABLET] Take 325 mg by mouth every evening.        atorvastatin (LIPITOR) 80 MG tablet Take 1 tablet (80 mg) by mouth At Bedtime 90 tablet 1     cholecalciferol, vitamin D3, 5,000 unit Tab [CHOLECALCIFEROL, VITAMIN D3, 5,000 UNIT TAB] Take 5,000 Units by mouth daily.        coenzyme Q-10 75 MG CAPS Take 1 capsule by mouth daily       DOCOSAHEXANOIC ACID/EPA (FISH OIL ORAL) [DOCOSAHEXANOIC ACID/EPA (FISH OIL ORAL)] Take by mouth. 1360 mg  Take 1 Capsule Daily       gentamicin (GARAMYCIN) 40 MG/ML injection gentamicin 40 mg/mL injection solution   Gentamicin       ginkgo biloba 40 mg Tab [GINKGO BILOBA 40 MG TAB] Take 40 mg by mouth daily.        ibuprofen (ADVIL/MOTRIN) 200 MG tablet ibuprofen 200 mg tablet   TAKE 2 TABLETS BY MOUTH EVERY 6 HOURS FOR 7 DAYS       multivitamin therapeutic (THERAGRAN) tablet [MULTIVITAMIN THERAPEUTIC (THERAGRAN) TABLET] Take 1 tablet by mouth daily.       predniSONE (DELTASONE) 5 MG tablet prednisone 5 mg tablet   TAKE 1 TABLET BY  MOUTH EVERY DAY       valsartan (DIOVAN) 160 MG tablet valsartan 160 mg tablet   TAKE 1 TABLET BY MOUTH EVERY DAY       vit C-vit E-copper-zinc-lutein (PRESERVISION LUTEIN) 226 mg-200 unit -5 mg-0.8 mg cap [VIT C-VIT E-COPPER-ZINC-LUTEIN (PRESERVISION LUTEIN) 226 MG-200 UNIT -5 MG-0.8 MG CAP] Take 1 capsule by mouth 2 (two) times a day.       hyaluronate sodium (HYALURONIC ACID, SODIUM, ORAL) [HYALURONATE SODIUM (HYALURONIC ACID, SODIUM, ORAL)] Take 1 capsule by mouth daily.       HYDROcodone-acetaminophen 5-325 mg per tablet [HYDROCODONE-ACETAMINOPHEN 5-325 MG PER TABLET] Take 1 tablet by mouth every 4 (four) hours as needed for pain. 20 tablet 0     MEDICATION CANNOT BE REORDERED - PLEASE MANUALLY REORDER AND DISCONTINUE THE OLD ORDER [COENZYME Q10 75 MG CAP] Take 1 capsule by mouth daily.       oxyCODONE-acetaminophen (PERCOCET/ENDOCET) 5-325 mg per tablet [OXYCODONE-ACETAMINOPHEN (PERCOCET/ENDOCET) 5-325 MG PER TABLET] Take 1 tablet by mouth every 6 (six) hours as needed for pain. 12 tablet 0     No Known Allergies       Lab Results    Chemistry/lipid CBC Cardiac Enzymes/BNP/TSH/INR   Recent Labs   Lab Test 01/18/22  0854   CHOL 156   HDL 58   LDL 79   TRIG 97     Recent Labs   Lab Test 01/18/22  0854 01/14/21  1024 01/09/20  1153   LDL 79 71 98     Recent Labs   Lab Test 07/15/22  1231      POTASSIUM 4.4   CHLORIDE 108*   CO2 28      BUN 29*   CR 1.26   GFRESTIMATED 60*   CAROLINA 9.5     Recent Labs   Lab Test 07/15/22  1231 01/18/22  0854 10/11/21  1215   CR 1.26 1.01 1.87*     No results for input(s): A1C in the last 96013 hours.       Recent Labs   Lab Test 07/15/22  1231   WBC 9.4   HGB 14.1   HCT 41.3   MCV 91        Recent Labs   Lab Test 07/15/22  1231 03/24/21  2024 05/24/20  0637   HGB 14.1 13.5* 11.3*    Recent Labs   Lab Test 05/22/20  1312   TROPONINI <0.01     No results for input(s): BNP, NTBNPI, NTBNP in the last 06852 hours.  No results for input(s): TSH in the last 28492  hours.  No results for input(s): INR in the last 59762 hours.     Medical History  Surgical History Family History Social History   Past Medical History:   Diagnosis Date     Cancer (H)     prostate     Coronary artery disease      Hyperlipidemia      Hypertension      Ureterolithiasis      Past Surgical History:   Procedure Laterality Date     APPENDECTOMY       BYPASS GRAFT ARTERY CORONARY       LITHOTRIPSY       Family History   Problem Relation Age of Onset     Multiple myeloma Father      Lung Cancer Mother      Prostate Cancer Brother         Social History     Socioeconomic History     Marital status:      Spouse name: Not on file     Number of children: 1     Years of education: 17     Highest education level: Not on file   Occupational History     Not on file   Tobacco Use     Smoking status: Never Smoker     Smokeless tobacco: Never Used   Substance and Sexual Activity     Alcohol use: Yes     Alcohol/week: 3.0 standard drinks     Drug use: Never     Sexual activity: Not Currently   Other Topics Concern     Not on file   Social History Narrative     Not on file     Social Determinants of Health     Financial Resource Strain: Not on file   Food Insecurity: Not on file   Transportation Needs: Not on file   Physical Activity: Not on file   Stress: Not on file   Social Connections: Not on file   Intimate Partner Violence: Not on file   Housing Stability: Not on file

## 2022-09-16 ENCOUNTER — HOSPITAL ENCOUNTER (OUTPATIENT)
Dept: CARDIOLOGY | Facility: CLINIC | Age: 77
Discharge: HOME | End: 2022-09-16
Attending: INTERNAL MEDICINE | Admitting: INTERNAL MEDICINE
Payer: COMMERCIAL

## 2022-09-16 DIAGNOSIS — I10 ESSENTIAL HYPERTENSION: ICD-10-CM

## 2022-09-16 DIAGNOSIS — I10 HYPERTENSION, UNSPECIFIED TYPE: ICD-10-CM

## 2022-09-16 DIAGNOSIS — E78.5 DYSLIPIDEMIA: ICD-10-CM

## 2022-09-16 DIAGNOSIS — R07.2 PRECORDIAL PAIN: Primary | ICD-10-CM

## 2022-09-16 DIAGNOSIS — I25.10 CORONARY ARTERY DISEASE INVOLVING NATIVE CORONARY ARTERY WITHOUT ANGINA PECTORIS, UNSPECIFIED WHETHER NATIVE OR TRANSPLANTED HEART: ICD-10-CM

## 2022-09-16 DIAGNOSIS — R07.2 PRECORDIAL PAIN: ICD-10-CM

## 2022-09-16 DIAGNOSIS — I25.10 CORONARY ARTERY DISEASE INVOLVING NATIVE CORONARY ARTERY OF NATIVE HEART WITHOUT ANGINA PECTORIS: ICD-10-CM

## 2022-09-16 PROCEDURE — 255N000002 HC RX 255 OP 636: Performed by: INTERNAL MEDICINE

## 2022-09-16 PROCEDURE — 999N000208 ECHO STRESS ECHOCARDIOGRAM

## 2022-09-16 PROCEDURE — 93350 STRESS TTE ONLY: CPT | Mod: 26 | Performed by: INTERNAL MEDICINE

## 2022-09-16 PROCEDURE — 93325 DOPPLER ECHO COLOR FLOW MAPG: CPT | Mod: 26 | Performed by: INTERNAL MEDICINE

## 2022-09-16 PROCEDURE — 93018 CV STRESS TEST I&R ONLY: CPT | Performed by: INTERNAL MEDICINE

## 2022-09-16 PROCEDURE — 93016 CV STRESS TEST SUPVJ ONLY: CPT | Performed by: INTERNAL MEDICINE

## 2022-09-16 PROCEDURE — 93321 DOPPLER ECHO F-UP/LMTD STD: CPT | Mod: TC

## 2022-09-16 PROCEDURE — 93321 DOPPLER ECHO F-UP/LMTD STD: CPT | Mod: 26 | Performed by: INTERNAL MEDICINE

## 2022-09-16 PROCEDURE — C8928 TTE W OR W/O FOL W/CON,STRES: HCPCS

## 2022-09-16 PROCEDURE — 93352 ADMIN ECG CONTRAST AGENT: CPT | Performed by: INTERNAL MEDICINE

## 2022-09-16 RX ADMIN — PERFLUTREN 5 ML: 6.52 INJECTION, SUSPENSION INTRAVENOUS at 08:40

## 2022-09-25 ENCOUNTER — HEALTH MAINTENANCE LETTER (OUTPATIENT)
Age: 77
End: 2022-09-25

## 2023-01-26 ENCOUNTER — LAB REQUISITION (OUTPATIENT)
Dept: LAB | Facility: CLINIC | Age: 78
End: 2023-01-26

## 2023-01-26 DIAGNOSIS — I10 ESSENTIAL (PRIMARY) HYPERTENSION: ICD-10-CM

## 2023-01-26 DIAGNOSIS — E78.5 HYPERLIPIDEMIA, UNSPECIFIED: ICD-10-CM

## 2023-01-26 LAB
ALBUMIN SERPL BCG-MCNC: 4.3 G/DL (ref 3.5–5.2)
ALP SERPL-CCNC: 95 U/L (ref 40–129)
ALT SERPL W P-5'-P-CCNC: 25 U/L (ref 10–50)
ANION GAP SERPL CALCULATED.3IONS-SCNC: 13 MMOL/L (ref 7–15)
AST SERPL W P-5'-P-CCNC: 21 U/L (ref 10–50)
BILIRUB SERPL-MCNC: 0.5 MG/DL
BUN SERPL-MCNC: 24 MG/DL (ref 8–23)
CALCIUM SERPL-MCNC: 9.6 MG/DL (ref 8.8–10.2)
CHLORIDE SERPL-SCNC: 105 MMOL/L (ref 98–107)
CHOLEST SERPL-MCNC: 174 MG/DL
CREAT SERPL-MCNC: 0.91 MG/DL (ref 0.67–1.17)
DEPRECATED HCO3 PLAS-SCNC: 24 MMOL/L (ref 22–29)
GFR SERPL CREATININE-BSD FRML MDRD: 88 ML/MIN/1.73M2
GLUCOSE SERPL-MCNC: 98 MG/DL (ref 70–99)
HDLC SERPL-MCNC: 79 MG/DL
LDLC SERPL CALC-MCNC: 73 MG/DL
NONHDLC SERPL-MCNC: 95 MG/DL
POTASSIUM SERPL-SCNC: 4.2 MMOL/L (ref 3.4–5.3)
PROT SERPL-MCNC: 5.9 G/DL (ref 6.4–8.3)
SODIUM SERPL-SCNC: 142 MMOL/L (ref 136–145)
TRIGL SERPL-MCNC: 108 MG/DL

## 2023-01-26 PROCEDURE — 80061 LIPID PANEL: CPT | Performed by: FAMILY MEDICINE

## 2023-01-26 PROCEDURE — 80053 COMPREHEN METABOLIC PANEL: CPT | Performed by: FAMILY MEDICINE

## 2023-05-04 ENCOUNTER — HOSPITAL ENCOUNTER (OUTPATIENT)
Dept: CT IMAGING | Facility: HOSPITAL | Age: 78
Discharge: HOME | End: 2023-05-04
Attending: NURSE PRACTITIONER | Admitting: NURSE PRACTITIONER
Payer: COMMERCIAL

## 2023-05-04 DIAGNOSIS — C61 MALIGNANT NEOPLASM OF PROSTATE (H): ICD-10-CM

## 2023-05-04 PROCEDURE — 71250 CT THORAX DX C-: CPT

## 2023-08-05 ENCOUNTER — HEALTH MAINTENANCE LETTER (OUTPATIENT)
Age: 78
End: 2023-08-05

## 2024-01-31 ENCOUNTER — LAB REQUISITION (OUTPATIENT)
Dept: LAB | Facility: CLINIC | Age: 79
End: 2024-01-31

## 2024-01-31 ENCOUNTER — TRANSFERRED RECORDS (OUTPATIENT)
Dept: HEALTH INFORMATION MANAGEMENT | Facility: CLINIC | Age: 79
End: 2024-01-31
Payer: COMMERCIAL

## 2024-01-31 DIAGNOSIS — E78.5 HYPERLIPIDEMIA, UNSPECIFIED: ICD-10-CM

## 2024-01-31 DIAGNOSIS — I25.10 ATHEROSCLEROTIC HEART DISEASE OF NATIVE CORONARY ARTERY WITHOUT ANGINA PECTORIS: ICD-10-CM

## 2024-01-31 PROCEDURE — 80061 LIPID PANEL: CPT | Performed by: FAMILY MEDICINE

## 2024-02-01 LAB
CHOLEST SERPL-MCNC: 170 MG/DL
FASTING STATUS PATIENT QL REPORTED: NORMAL
HDLC SERPL-MCNC: 72 MG/DL
LDLC SERPL CALC-MCNC: 68 MG/DL
NONHDLC SERPL-MCNC: 98 MG/DL
TRIGL SERPL-MCNC: 149 MG/DL

## 2024-08-06 ENCOUNTER — TRANSFERRED RECORDS (OUTPATIENT)
Dept: HEALTH INFORMATION MANAGEMENT | Facility: CLINIC | Age: 79
End: 2024-08-06

## 2024-09-28 ENCOUNTER — HEALTH MAINTENANCE LETTER (OUTPATIENT)
Age: 79
End: 2024-09-28

## 2024-11-14 ENCOUNTER — OFFICE VISIT (OUTPATIENT)
Dept: CARDIOLOGY | Facility: CLINIC | Age: 79
End: 2024-11-14
Payer: COMMERCIAL

## 2024-11-14 VITALS
BODY MASS INDEX: 35.49 KG/M2 | RESPIRATION RATE: 20 BRPM | DIASTOLIC BLOOD PRESSURE: 80 MMHG | HEART RATE: 74 BPM | OXYGEN SATURATION: 97 % | SYSTOLIC BLOOD PRESSURE: 150 MMHG | WEIGHT: 269 LBS

## 2024-11-14 DIAGNOSIS — I10 HYPERTENSION, UNSPECIFIED TYPE: ICD-10-CM

## 2024-11-14 DIAGNOSIS — I25.10 CORONARY ARTERY DISEASE INVOLVING NATIVE CORONARY ARTERY OF NATIVE HEART WITHOUT ANGINA PECTORIS: Primary | ICD-10-CM

## 2024-11-14 DIAGNOSIS — R07.2 PRECORDIAL PAIN: ICD-10-CM

## 2024-11-14 DIAGNOSIS — E78.5 DYSLIPIDEMIA: ICD-10-CM

## 2024-11-14 PROCEDURE — 99214 OFFICE O/P EST MOD 30 MIN: CPT | Performed by: INTERNAL MEDICINE

## 2024-11-14 RX ORDER — VALSARTAN 320 MG/1
320 TABLET ORAL DAILY
Qty: 90 TABLET | Refills: 3 | Status: SHIPPED | OUTPATIENT
Start: 2024-11-14

## 2024-11-14 RX ORDER — HYDROCORTISONE SODIUM SUCCINATE 100 MG/2ML
100 INJECTION INTRAMUSCULAR; INTRAVENOUS ONCE
COMMUNITY
Start: 2024-01-18 | End: 2024-11-14

## 2024-11-14 NOTE — LETTER
11/14/2024    Ashwin Yeh MD  [address] [place] [address]    RE: Jc Balderrama Sr.       Dear Colleague,     I had the pleasure of seeing Jc Balderrama Sr. in the [facility].         [facility]   [address], [place] [address]   [email or web address]   OFFICE: [phone]          Thank you Dr. Yeh for asking the [facility] team to participate in the care of your patient, Jc Gradyroxann GreyFlorencio.     Impression and Plan     1. Coronary artery disease. ?Jc has known coronary artery disease. ?Specifically, Jc underwent coronary artery bypass graft surgery 8 January 2013, at which time, he had successful bypass with YESSENIA graft to the LAD, saphenous vein graft to the PDA-PL sequentially, and saphenous vein graft to the ramus to obtuse marginal No. 1. &?sequentially to obtuse marginal No. 2.   ?   Stress echocardiogram 19 March 2020 was favorable and revealed no concerning findings.   ?   As noted below, Hernandez does report some symptoms of intermittent chest discomfort though some features a bit atypical.  At times he can do his regular workout routine without any issues.  Cannot entirely discount possible ischemic contribution to some of his symptom profile.  Plan:  Stress echocardiogram.  ?   2.  Hypertension.   Blood pressure somewhat elevated in the office today.  He reports some tendency toward higher readings as of late on his home blood pressure device as well.  Increase valsartan from 160 mg daily to 320 mg daily.  Continue amlodipine 10 mg daily.  ?   3. Dyslipidemia. ?  Lipid profile 18 January 2022 was fairly close to target with LDL 79 mg/dL and HDL 58 mg/dL.   Will obtain fasting lipid profile this morning.  Continue statin therapy.  ?   Follow-up and further recommendations pending stress test results.    35 minutes spent reviewing prior records (including documentation, laboratory studies, cardiac  testing/imaging), interview with patient along with physical exam, planning, and subsequent documentation/crafting of note).           History of Present Illness    Once again I would like to thank you again for asking me to participate in the care of your patient, Jc Balderrama Sr..  As you know, but to reiterate for my own records, Jc Balderrama Sr. is a 79 year old male with known coronary disease. He underwent coronary artery bypass graft surgery 8 January 2013, at which time, he had successful bypass with YESSENIA graft to the LAD, saphenous vein graft to the PDA-PL sequentially, and saphenous vein graft to the ramus to obtuse marginal No. 1. & sequentially to obtuse marginal No. 2.   ?   In follow-up today, Hernandez does report some intermittent chest discomfort though somewhat dissimilar from when he was originally diagnosed with coronary disease 11  years ago.  At times it does seem to be brought on with certain activities though certainly not in a consistent fashion.  He does workout regularly and commonly can do his workout routine without any issues at all.      Further review of systems is otherwise negative/noncontributory (medical record and 13 point review of systems reviewed as well and pertinent positives noted).         Cardiac Diagnostics      Stress echocardiogram 25 August 2022 (personally reviewed):  Normal stress echocardiogram without evidence of stress induced ischemia.  Normal resting LV systolic performance with an ejection fraction of 55-60%. There is normal improvement in left ventricular systolic performance with a peak ejection fraction of 70-75%.  No ECG evidence of ischemia.  No anginal chest pain reported with exercise.  Good functional capacity for age.  Baseline hypertension with mildly hypertensive response to exercise.    Stress echocardiogram 19 March 2020:   No echocardiographic evidence of ischemia.   Normal resting left ventricular systolic performance with ejection fraction of  60%.   No ECG evidence of ischemia.   Normal functional capacity.   ?   Exercise nuclear perfusion study 25 March 2016:   No evidence of infarct or ischemia.   Normal left ventricular systolic performance with ejection fraction of 63%.   ?   Echocardiogram 8 January 2013 (intraoperative RALF):   Normal left ventricular size and systolic performance.   Moderate concentric increase in thickness.   No significant valvular heart disease.             Physical Examination       BP (!) 150/80 (BP Location: Left arm, Patient Position: Sitting, Cuff Size: Adult Regular)   Pulse 74   Resp 20   Wt 122 kg (269 lb)   SpO2 97%   BMI 35.49 kg/m          Wt Readings from Last 3 Encounters:   11/14/24 122 kg (269 lb)   08/25/22 115.6 kg (254 lb 14.4 oz)   04/20/21 108 kg (238 lb)       The patient is alert and oriented times three. Sclerae are anicteric. Mucosal membranes are moist. Jugular venous pressure is normal. No significant adenopathy/thyromegally appreciated. Lungs are clear with good expansion. On cardiovascular exam, the patient has a regular S1 and S2. Abdomen is soft and non-tender. Extremities reveal no clubbing, cyanosis, or edema.         Patient does not smoke.  Family history reviewed, and family history includes Lung Cancer in his mother; Multiple myeloma in his father; Prostate Cancer in his brother.          Medical History  Surgical History Family History Social History   Past Medical History:   Diagnosis Date     Cancer (H)     prostate     Coronary artery disease      Hyperlipidemia      Hypertension      Ureterolithiasis      Past Surgical History:   Procedure Laterality Date     APPENDECTOMY       BYPASS GRAFT ARTERY CORONARY       LITHOTRIPSY       Family History   Problem Relation Age of Onset     Multiple myeloma Father      Lung Cancer Mother      Prostate Cancer Brother         Social History     Socioeconomic History     Marital status:      Spouse name: Not on file     Number of children:  1     Years of education: 17     Highest education level: Not on file   Occupational History     Not on file   Tobacco Use     Smoking status: Never     Smokeless tobacco: Never   Substance and Sexual Activity     Alcohol use: Yes     Alcohol/week: 3.0 standard drinks of alcohol     Drug use: Never     Sexual activity: Not Currently   Other Topics Concern     Not on file   Social History Narrative     Not on file     Social Drivers of Health     Financial Resource Strain: Not on file   Food Insecurity: Not on file   Transportation Needs: Not on file   Physical Activity: Not on file   Stress: Not on file   Social Connections: Not on file   Interpersonal Safety: Not on file   Housing Stability: Not on file           Medications  Allergies   Current Outpatient Medications   Medication Sig Dispense Refill     amLODIPine (NORVASC) 10 MG tablet [AMLODIPINE (NORVASC) 10 MG TABLET] Take 10 mg by mouth daily.       aspirin 325 MG tablet [ASPIRIN 325 MG TABLET] Take 325 mg by mouth every evening.        atorvastatin (LIPITOR) 80 MG tablet Take 1 tablet (80 mg) by mouth At Bedtime 90 tablet 1     cholecalciferol, vitamin D3, 5,000 unit Tab [CHOLECALCIFEROL, VITAMIN D3, 5,000 UNIT TAB] Take 5,000 Units by mouth daily.        coenzyme Q-10 75 MG CAPS Take 1 capsule by mouth daily       DOCOSAHEXANOIC ACID/EPA (FISH OIL ORAL) [DOCOSAHEXANOIC ACID/EPA (FISH OIL ORAL)] Take by mouth. 1360 mg  Take 1 Capsule Daily       gentamicin (GARAMYCIN) 40 MG/ML injection gentamicin 40 mg/mL injection solution   Gentamicin       ginkgo biloba 40 mg Tab [GINKGO BILOBA 40 MG TAB] Take 40 mg by mouth daily.        [START ON 1/29/2025] leuprolide, 3 Month, (ELIGARD) 22.5 MG Inject 22.5 mg subcutaneously every 3 months.       methylprednisoLONE sodium succinate (SOLU-MEDROL) 2000 MG injection Inject 125 mg into the vein once.       multivitamin therapeutic (THERAGRAN) tablet [MULTIVITAMIN THERAPEUTIC (THERAGRAN) TABLET] Take 1 tablet by mouth  "daily.       valsartan (DIOVAN) 160 MG tablet valsartan 160 mg tablet   TAKE 1 TABLET BY MOUTH EVERY DAY       valsartan (DIOVAN) 320 MG tablet Take 1 tablet (320 mg) by mouth daily. 90 tablet 3     vit C-vit E-copper-zinc-lutein (PRESERVISION LUTEIN) 226 mg-200 unit -5 mg-0.8 mg cap [VIT C-VIT E-COPPER-ZINC-LUTEIN (PRESERVISION LUTEIN) 226 MG-200 UNIT -5 MG-0.8 MG CAP] Take 1 capsule by mouth 2 (two) times a day.       No Known Allergies       Lab Results    Chemistry/lipid CBC Cardiac Enzymes/BNP/TSH/INR   Recent Labs   Lab Test 01/31/24  1534   CHOL 170   HDL 72   LDL 68   TRIG 149     Recent Labs   Lab Test 01/31/24  1534 01/26/23  1017 01/18/22  0854   LDL 68 73 79     Recent Labs   Lab Test 01/26/23  1017      POTASSIUM 4.2   CHLORIDE 105   CO2 24   GLC 98   BUN 24.0*   CR 0.91   GFRESTIMATED 88   CAROLINA 9.6     Recent Labs   Lab Test 01/26/23  1017 07/15/22  1231 01/18/22  0854   CR 0.91 1.26 1.01     No results for input(s): \"A1C\" in the last 72470 hours.       Recent Labs   Lab Test 07/15/22  1231   WBC 9.4   HGB 14.1   HCT 41.3   MCV 91        Recent Labs   Lab Test 07/15/22  1231 03/24/21  2024 05/24/20  0637   HGB 14.1 13.5* 11.3*    Recent Labs   Lab Test 05/22/20  1312   TROPONINI <0.01     No results for input(s): \"BNP\", \"NTBNPI\", \"NTBNP\" in the last 24560 hours.  No results for input(s): \"TSH\" in the last 52604 hours.  No results for input(s): \"INR\" in the last 16412 hours.       Medications  Allergies   Current Outpatient Medications   Medication Sig Dispense Refill     amLODIPine (NORVASC) 10 MG tablet [AMLODIPINE (NORVASC) 10 MG TABLET] Take 10 mg by mouth daily.       aspirin 325 MG tablet [ASPIRIN 325 MG TABLET] Take 325 mg by mouth every evening.        atorvastatin (LIPITOR) 80 MG tablet Take 1 tablet (80 mg) by mouth At Bedtime 90 tablet 1     cholecalciferol, vitamin D3, 5,000 unit Tab [CHOLECALCIFEROL, VITAMIN D3, 5,000 UNIT TAB] Take 5,000 Units by mouth daily.        coenzyme " "Q-10 75 MG CAPS Take 1 capsule by mouth daily       DOCOSAHEXANOIC ACID/EPA (FISH OIL ORAL) [DOCOSAHEXANOIC ACID/EPA (FISH OIL ORAL)] Take by mouth. 1360 mg  Take 1 Capsule Daily       gentamicin (GARAMYCIN) 40 MG/ML injection gentamicin 40 mg/mL injection solution   Gentamicin       ginkgo biloba 40 mg Tab [GINKGO BILOBA 40 MG TAB] Take 40 mg by mouth daily.        [START ON 1/29/2025] leuprolide, 3 Month, (ELIGARD) 22.5 MG Inject 22.5 mg subcutaneously every 3 months.       methylprednisoLONE sodium succinate (SOLU-MEDROL) 2000 MG injection Inject 125 mg into the vein once.       multivitamin therapeutic (THERAGRAN) tablet [MULTIVITAMIN THERAPEUTIC (THERAGRAN) TABLET] Take 1 tablet by mouth daily.       valsartan (DIOVAN) 160 MG tablet valsartan 160 mg tablet   TAKE 1 TABLET BY MOUTH EVERY DAY       valsartan (DIOVAN) 320 MG tablet Take 1 tablet (320 mg) by mouth daily. 90 tablet 3     vit C-vit E-copper-zinc-lutein (PRESERVISION LUTEIN) 226 mg-200 unit -5 mg-0.8 mg cap [VIT C-VIT E-COPPER-ZINC-LUTEIN (PRESERVISION LUTEIN) 226 MG-200 UNIT -5 MG-0.8 MG CAP] Take 1 capsule by mouth 2 (two) times a day.        No Known Allergies       Lab Results   Lab Results   Component Value Date     01/26/2023    CO2 24 01/26/2023    CO2 28 07/15/2022    BUN 24.0 01/26/2023    BUN 29 07/15/2022     Lab Results   Component Value Date    WBC 9.4 07/15/2022    HGB 14.1 07/15/2022    HCT 41.3 07/15/2022    MCV 91 07/15/2022     07/15/2022     Lab Results   Component Value Date    CHOL 170 01/31/2024    TRIG 149 01/31/2024    HDL 72 01/31/2024     No results found for: \"INR\"  No results found for: \"BNP\"  Lab Results   Component Value Date    TROPONINI <0.01 05/22/2020     No results found for: \"TSH\"                 Thank you for allowing me to participate in the care of your patient.      Sincerely,     Joann Stevens MD     [facility]  cc:   Ashwin Yeh, " MD  [address] [place] [address]

## 2024-11-14 NOTE — PROGRESS NOTES
[facility] [address], [place] [address]   [email or web address]   OFFICE: [phone]          Thank you Dr. Yeh for asking the [facility] team to participate in the care of your patient, Jc Balderrama Sr..     Impression and Plan     1. Coronary artery disease. ?Jc has known coronary artery disease. ?Specifically, Jc underwent coronary artery bypass graft surgery 8 January 2013, at which time, he had successful bypass with YESSENIA graft to the LAD, saphenous vein graft to the PDA-PL sequentially, and saphenous vein graft to the ramus to obtuse marginal No. 1. &?sequentially to obtuse marginal No. 2.   ?   Stress echocardiogram 19 March 2020 was favorable and revealed no concerning findings.   ?   As noted below, Hernandez does report some symptoms of intermittent chest discomfort though some features a bit atypical.  At times he can do his regular workout routine without any issues.  Cannot entirely discount possible ischemic contribution to some of his symptom profile.  Plan:  Stress echocardiogram.  ?   2.  Hypertension.   Blood pressure somewhat elevated in the office today.  He reports some tendency toward higher readings as of late on his home blood pressure device as well.  Increase valsartan from 160 mg daily to 320 mg daily.  Continue amlodipine 10 mg daily.  ?   3. Dyslipidemia. ?  Lipid profile 18 January 2022 was fairly close to target with LDL 79 mg/dL and HDL 58 mg/dL.   Will obtain fasting lipid profile this morning.  Continue statin therapy.  ?   Follow-up and further recommendations pending stress test results.    35 minutes spent reviewing prior records (including documentation, laboratory studies, cardiac testing/imaging), interview with patient along with physical exam, planning, and subsequent documentation/crafting of note).           History of Present Illness    Once again I would like to thank you again for  asking me to participate in the care of your patient, Jc Balderrama Sr..  As you know, but to reiterate for my own records, Jc Balderrama Sr. is a 79 year old male with known coronary disease. He underwent coronary artery bypass graft surgery 8 January 2013, at which time, he had successful bypass with YESSENIA graft to the LAD, saphenous vein graft to the PDA-PL sequentially, and saphenous vein graft to the ramus to obtuse marginal No. 1. & sequentially to obtuse marginal No. 2.   ?   In follow-up today, Hernandez does report some intermittent chest discomfort though somewhat dissimilar from when he was originally diagnosed with coronary disease 11  years ago.  At times it does seem to be brought on with certain activities though certainly not in a consistent fashion.  He does workout regularly and commonly can do his workout routine without any issues at all.      Further review of systems is otherwise negative/noncontributory (medical record and 13 point review of systems reviewed as well and pertinent positives noted).         Cardiac Diagnostics      Stress echocardiogram 25 August 2022 (personally reviewed):  Normal stress echocardiogram without evidence of stress induced ischemia.  Normal resting LV systolic performance with an ejection fraction of 55-60%. There is normal improvement in left ventricular systolic performance with a peak ejection fraction of 70-75%.  No ECG evidence of ischemia.  No anginal chest pain reported with exercise.  Good functional capacity for age.  Baseline hypertension with mildly hypertensive response to exercise.    Stress echocardiogram 19 March 2020:   No echocardiographic evidence of ischemia.   Normal resting left ventricular systolic performance with ejection fraction of 60%.   No ECG evidence of ischemia.   Normal functional capacity.   ?   Exercise nuclear perfusion study 25 March 2016:   No evidence of infarct or ischemia.   Normal left ventricular systolic performance with  ejection fraction of 63%.   ?   Echocardiogram 8 January 2013 (intraoperative RALF):   Normal left ventricular size and systolic performance.   Moderate concentric increase in thickness.   No significant valvular heart disease.             Physical Examination       BP (!) 150/80 (BP Location: Left arm, Patient Position: Sitting, Cuff Size: Adult Regular)   Pulse 74   Resp 20   Wt 122 kg (269 lb)   SpO2 97%   BMI 35.49 kg/m          Wt Readings from Last 3 Encounters:   11/14/24 122 kg (269 lb)   08/25/22 115.6 kg (254 lb 14.4 oz)   04/20/21 108 kg (238 lb)       The patient is alert and oriented times three. Sclerae are anicteric. Mucosal membranes are moist. Jugular venous pressure is normal. No significant adenopathy/thyromegally appreciated. Lungs are clear with good expansion. On cardiovascular exam, the patient has a regular S1 and S2. Abdomen is soft and non-tender. Extremities reveal no clubbing, cyanosis, or edema.         Patient does not smoke.  Family history reviewed, and family history includes Lung Cancer in his mother; Multiple myeloma in his father; Prostate Cancer in his brother.          Medical History  Surgical History Family History Social History   Past Medical History:   Diagnosis Date    Cancer (H)     prostate    Coronary artery disease     Hyperlipidemia     Hypertension     Ureterolithiasis      Past Surgical History:   Procedure Laterality Date    APPENDECTOMY      BYPASS GRAFT ARTERY CORONARY      LITHOTRIPSY       Family History   Problem Relation Age of Onset    Multiple myeloma Father     Lung Cancer Mother     Prostate Cancer Brother         Social History     Socioeconomic History    Marital status:      Spouse name: Not on file    Number of children: 1    Years of education: 17    Highest education level: Not on file   Occupational History    Not on file   Tobacco Use    Smoking status: Never    Smokeless tobacco: Never   Substance and Sexual Activity    Alcohol use:  Yes     Alcohol/week: 3.0 standard drinks of alcohol    Drug use: Never    Sexual activity: Not Currently   Other Topics Concern    Not on file   Social History Narrative    Not on file     Social Drivers of Health     Financial Resource Strain: Not on file   Food Insecurity: Not on file   Transportation Needs: Not on file   Physical Activity: Not on file   Stress: Not on file   Social Connections: Not on file   Interpersonal Safety: Not on file   Housing Stability: Not on file           Medications  Allergies   Current Outpatient Medications   Medication Sig Dispense Refill    amLODIPine (NORVASC) 10 MG tablet [AMLODIPINE (NORVASC) 10 MG TABLET] Take 10 mg by mouth daily.      aspirin 325 MG tablet [ASPIRIN 325 MG TABLET] Take 325 mg by mouth every evening.       atorvastatin (LIPITOR) 80 MG tablet Take 1 tablet (80 mg) by mouth At Bedtime 90 tablet 1    cholecalciferol, vitamin D3, 5,000 unit Tab [CHOLECALCIFEROL, VITAMIN D3, 5,000 UNIT TAB] Take 5,000 Units by mouth daily.       coenzyme Q-10 75 MG CAPS Take 1 capsule by mouth daily      DOCOSAHEXANOIC ACID/EPA (FISH OIL ORAL) [DOCOSAHEXANOIC ACID/EPA (FISH OIL ORAL)] Take by mouth. 1360 mg  Take 1 Capsule Daily      gentamicin (GARAMYCIN) 40 MG/ML injection gentamicin 40 mg/mL injection solution   Gentamicin      ginkgo biloba 40 mg Tab [GINKGO BILOBA 40 MG TAB] Take 40 mg by mouth daily.       [START ON 1/29/2025] leuprolide, 3 Month, (ELIGARD) 22.5 MG Inject 22.5 mg subcutaneously every 3 months.      methylprednisoLONE sodium succinate (SOLU-MEDROL) 2000 MG injection Inject 125 mg into the vein once.      multivitamin therapeutic (THERAGRAN) tablet [MULTIVITAMIN THERAPEUTIC (THERAGRAN) TABLET] Take 1 tablet by mouth daily.      valsartan (DIOVAN) 160 MG tablet valsartan 160 mg tablet   TAKE 1 TABLET BY MOUTH EVERY DAY      valsartan (DIOVAN) 320 MG tablet Take 1 tablet (320 mg) by mouth daily. 90 tablet 3    vit C-vit E-copper-zinc-lutein (PRESERVISION  "LUTEIN) 226 mg-200 unit -5 mg-0.8 mg cap [VIT C-VIT E-COPPER-ZINC-LUTEIN (PRESERVISION LUTEIN) 226 MG-200 UNIT -5 MG-0.8 MG CAP] Take 1 capsule by mouth 2 (two) times a day.       No Known Allergies       Lab Results    Chemistry/lipid CBC Cardiac Enzymes/BNP/TSH/INR   Recent Labs   Lab Test 01/31/24  1534   CHOL 170   HDL 72   LDL 68   TRIG 149     Recent Labs   Lab Test 01/31/24  1534 01/26/23  1017 01/18/22  0854   LDL 68 73 79     Recent Labs   Lab Test 01/26/23  1017      POTASSIUM 4.2   CHLORIDE 105   CO2 24   GLC 98   BUN 24.0*   CR 0.91   GFRESTIMATED 88   CAROLINA 9.6     Recent Labs   Lab Test 01/26/23  1017 07/15/22  1231 01/18/22  0854   CR 0.91 1.26 1.01     No results for input(s): \"A1C\" in the last 27706 hours.       Recent Labs   Lab Test 07/15/22  1231   WBC 9.4   HGB 14.1   HCT 41.3   MCV 91        Recent Labs   Lab Test 07/15/22  1231 03/24/21  2024 05/24/20  0637   HGB 14.1 13.5* 11.3*    Recent Labs   Lab Test 05/22/20  1312   TROPONINI <0.01     No results for input(s): \"BNP\", \"NTBNPI\", \"NTBNP\" in the last 95062 hours.  No results for input(s): \"TSH\" in the last 10636 hours.  No results for input(s): \"INR\" in the last 21450 hours.       Medications  Allergies   Current Outpatient Medications   Medication Sig Dispense Refill    amLODIPine (NORVASC) 10 MG tablet [AMLODIPINE (NORVASC) 10 MG TABLET] Take 10 mg by mouth daily.      aspirin 325 MG tablet [ASPIRIN 325 MG TABLET] Take 325 mg by mouth every evening.       atorvastatin (LIPITOR) 80 MG tablet Take 1 tablet (80 mg) by mouth At Bedtime 90 tablet 1    cholecalciferol, vitamin D3, 5,000 unit Tab [CHOLECALCIFEROL, VITAMIN D3, 5,000 UNIT TAB] Take 5,000 Units by mouth daily.       coenzyme Q-10 75 MG CAPS Take 1 capsule by mouth daily      DOCOSAHEXANOIC ACID/EPA (FISH OIL ORAL) [DOCOSAHEXANOIC ACID/EPA (FISH OIL ORAL)] Take by mouth. 1360 mg  Take 1 Capsule Daily      gentamicin (GARAMYCIN) 40 MG/ML injection gentamicin 40 mg/mL " "injection solution   Gentamicin      ginkgo biloba 40 mg Tab [GINKGO BILOBA 40 MG TAB] Take 40 mg by mouth daily.       [START ON 1/29/2025] leuprolide, 3 Month, (ELIGARD) 22.5 MG Inject 22.5 mg subcutaneously every 3 months.      methylprednisoLONE sodium succinate (SOLU-MEDROL) 2000 MG injection Inject 125 mg into the vein once.      multivitamin therapeutic (THERAGRAN) tablet [MULTIVITAMIN THERAPEUTIC (THERAGRAN) TABLET] Take 1 tablet by mouth daily.      valsartan (DIOVAN) 160 MG tablet valsartan 160 mg tablet   TAKE 1 TABLET BY MOUTH EVERY DAY      valsartan (DIOVAN) 320 MG tablet Take 1 tablet (320 mg) by mouth daily. 90 tablet 3    vit C-vit E-copper-zinc-lutein (PRESERVISION LUTEIN) 226 mg-200 unit -5 mg-0.8 mg cap [VIT C-VIT E-COPPER-ZINC-LUTEIN (PRESERVISION LUTEIN) 226 MG-200 UNIT -5 MG-0.8 MG CAP] Take 1 capsule by mouth 2 (two) times a day.        No Known Allergies       Lab Results   Lab Results   Component Value Date     01/26/2023    CO2 24 01/26/2023    CO2 28 07/15/2022    BUN 24.0 01/26/2023    BUN 29 07/15/2022     Lab Results   Component Value Date    WBC 9.4 07/15/2022    HGB 14.1 07/15/2022    HCT 41.3 07/15/2022    MCV 91 07/15/2022     07/15/2022     Lab Results   Component Value Date    CHOL 170 01/31/2024    TRIG 149 01/31/2024    HDL 72 01/31/2024     No results found for: \"INR\"  No results found for: \"BNP\"  Lab Results   Component Value Date    TROPONINI <0.01 05/22/2020     No results found for: \"TSH\"               "

## 2024-11-25 ENCOUNTER — HOSPITAL ENCOUNTER (OUTPATIENT)
Dept: CARDIOLOGY | Facility: CLINIC | Age: 79
Discharge: HOME | End: 2024-11-25
Attending: INTERNAL MEDICINE | Admitting: INTERNAL MEDICINE
Payer: COMMERCIAL

## 2024-11-25 DIAGNOSIS — I25.10 CORONARY ARTERY DISEASE INVOLVING NATIVE CORONARY ARTERY OF NATIVE HEART WITHOUT ANGINA PECTORIS: ICD-10-CM

## 2024-11-25 DIAGNOSIS — R07.2 PRECORDIAL PAIN: ICD-10-CM

## 2024-11-25 LAB
CV STRESS CURRENT BP HE: NORMAL
CV STRESS CURRENT HR HE: 105
CV STRESS CURRENT HR HE: 106
CV STRESS CURRENT HR HE: 106
CV STRESS CURRENT HR HE: 109
CV STRESS CURRENT HR HE: 109
CV STRESS CURRENT HR HE: 111
CV STRESS CURRENT HR HE: 117
CV STRESS CURRENT HR HE: 125
CV STRESS CURRENT HR HE: 125
CV STRESS CURRENT HR HE: 79
CV STRESS CURRENT HR HE: 80
CV STRESS CURRENT HR HE: 80
CV STRESS CURRENT HR HE: 81
CV STRESS CURRENT HR HE: 82
CV STRESS CURRENT HR HE: 83
CV STRESS CURRENT HR HE: 83
CV STRESS CURRENT HR HE: 84
CV STRESS CURRENT HR HE: 85
CV STRESS CURRENT HR HE: 87
CV STRESS CURRENT HR HE: 90
CV STRESS CURRENT HR HE: 91
CV STRESS CURRENT HR HE: 94
CV STRESS CURRENT HR HE: 96
CV STRESS CURRENT HR HE: 96
CV STRESS CURRENT HR HE: 97
CV STRESS DEVIATION TIME HE: NORMAL
CV STRESS ECHO PERCENT HR HE: NORMAL
CV STRESS EXERCISE STAGE HE: NORMAL
CV STRESS EXERCISE STAGE REACHED HE: NORMAL
CV STRESS FINAL RESTING BP HE: NORMAL
CV STRESS FINAL RESTING HR HE: 82
CV STRESS MAX HR HE: 125
CV STRESS MAX TREADMILL GRADE HE: 14
CV STRESS MAX TREADMILL SPEED HE: 3.4
CV STRESS PEAK DIA BP HE: NORMAL
CV STRESS PEAK SYS BP HE: NORMAL
CV STRESS PHASE HE: NORMAL
CV STRESS PROTOCOL HE: NORMAL
CV STRESS REASON STOPPED HE: NORMAL
CV STRESS RESTING PT POSITION HE: NORMAL
CV STRESS RESTING PT POSITION HE: NORMAL
CV STRESS ST DEVIATION AMOUNT HE: NORMAL
CV STRESS ST DEVIATION ELEVATION HE: NORMAL
CV STRESS ST EVELATION AMOUNT HE: NORMAL
CV STRESS SYMPTOMS HE: NORMAL
CV STRESS TEST TYPE HE: NORMAL
CV STRESS TOTAL STAGE TIME MIN 1 HE: NORMAL
STRESS ECHO BASELINE DIASTOLIC HE: 101
STRESS ECHO BASELINE HR: 82
STRESS ECHO BASELINE SYSTOLIC BP: 160
STRESS ECHO LAST STRESS DIASTOLIC BP: 88
STRESS ECHO LAST STRESS HR: 125
STRESS ECHO LAST STRESS SYSTOLIC BP: 202
STRESS ECHO POST ESTIMATED WORKLOAD: 10.1
STRESS ECHO POST EXERCISE DUR MIN: 7
STRESS ECHO POST EXERCISE DUR SEC: 45
STRESS ECHO TARGET HR: 122

## 2024-11-25 PROCEDURE — 255N000002 HC RX 255 OP 636: Performed by: INTERNAL MEDICINE

## 2024-11-25 PROCEDURE — 93321 DOPPLER ECHO F-UP/LMTD STD: CPT | Mod: 26 | Performed by: INTERNAL MEDICINE

## 2024-11-25 PROCEDURE — C8928 TTE W OR W/O FOL W/CON,STRES: HCPCS

## 2024-11-25 PROCEDURE — 93016 CV STRESS TEST SUPVJ ONLY: CPT | Performed by: INTERNAL MEDICINE

## 2024-11-25 PROCEDURE — 93325 DOPPLER ECHO COLOR FLOW MAPG: CPT | Mod: 26 | Performed by: INTERNAL MEDICINE

## 2024-11-25 PROCEDURE — 93350 STRESS TTE ONLY: CPT | Mod: 26 | Performed by: INTERNAL MEDICINE

## 2024-11-25 PROCEDURE — 93018 CV STRESS TEST I&R ONLY: CPT | Performed by: INTERNAL MEDICINE

## 2024-11-25 RX ADMIN — PERFLUTREN 4 ML: 6.52 INJECTION, SUSPENSION INTRAVENOUS at 08:48

## 2024-12-02 DIAGNOSIS — E78.5 DYSLIPIDEMIA: ICD-10-CM

## 2024-12-02 DIAGNOSIS — I10 HYPERTENSION, UNSPECIFIED TYPE: ICD-10-CM

## 2024-12-02 DIAGNOSIS — I25.10 CORONARY ARTERY DISEASE INVOLVING NATIVE CORONARY ARTERY OF NATIVE HEART WITHOUT ANGINA PECTORIS: Primary | ICD-10-CM

## 2025-02-04 ENCOUNTER — LAB REQUISITION (OUTPATIENT)
Dept: LAB | Facility: CLINIC | Age: 80
End: 2025-02-04

## 2025-02-04 ENCOUNTER — TRANSFERRED RECORDS (OUTPATIENT)
Dept: HEALTH INFORMATION MANAGEMENT | Facility: CLINIC | Age: 80
End: 2025-02-04
Payer: COMMERCIAL

## 2025-02-04 DIAGNOSIS — I10 ESSENTIAL (PRIMARY) HYPERTENSION: ICD-10-CM

## 2025-02-04 LAB
ALBUMIN SERPL BCG-MCNC: 4.5 G/DL (ref 3.5–5.2)
ALP SERPL-CCNC: 116 U/L (ref 40–150)
ALT SERPL W P-5'-P-CCNC: 26 U/L (ref 0–70)
ANION GAP SERPL CALCULATED.3IONS-SCNC: 12 MMOL/L (ref 7–15)
AST SERPL W P-5'-P-CCNC: 24 U/L (ref 0–45)
BILIRUB SERPL-MCNC: 0.4 MG/DL
BUN SERPL-MCNC: 29 MG/DL (ref 8–23)
CALCIUM SERPL-MCNC: 9.7 MG/DL (ref 8.8–10.4)
CHLORIDE SERPL-SCNC: 108 MMOL/L (ref 98–107)
CHOLEST SERPL-MCNC: 192 MG/DL
CREAT SERPL-MCNC: 1.03 MG/DL (ref 0.67–1.17)
EGFRCR SERPLBLD CKD-EPI 2021: 75 ML/MIN/1.73M2
FASTING STATUS PATIENT QL REPORTED: YES
FASTING STATUS PATIENT QL REPORTED: YES
GLUCOSE SERPL-MCNC: 103 MG/DL (ref 70–99)
HCO3 SERPL-SCNC: 23 MMOL/L (ref 22–29)
HDLC SERPL-MCNC: 76 MG/DL
LDLC SERPL CALC-MCNC: 94 MG/DL
NONHDLC SERPL-MCNC: 116 MG/DL
POTASSIUM SERPL-SCNC: 4.3 MMOL/L (ref 3.4–5.3)
PROT SERPL-MCNC: 7 G/DL (ref 6.4–8.3)
SODIUM SERPL-SCNC: 143 MMOL/L (ref 135–145)
TRIGL SERPL-MCNC: 109 MG/DL

## 2025-02-04 PROCEDURE — 82247 BILIRUBIN TOTAL: CPT | Performed by: FAMILY MEDICINE

## 2025-02-04 PROCEDURE — 82947 ASSAY GLUCOSE BLOOD QUANT: CPT | Performed by: FAMILY MEDICINE

## 2025-02-04 PROCEDURE — 80061 LIPID PANEL: CPT | Performed by: FAMILY MEDICINE

## 2025-02-04 PROCEDURE — 82435 ASSAY OF BLOOD CHLORIDE: CPT | Performed by: FAMILY MEDICINE
